# Patient Record
Sex: FEMALE | Race: WHITE | Employment: UNEMPLOYED | URBAN - METROPOLITAN AREA
[De-identification: names, ages, dates, MRNs, and addresses within clinical notes are randomized per-mention and may not be internally consistent; named-entity substitution may affect disease eponyms.]

---

## 2017-10-27 ENCOUNTER — HOSPITAL ENCOUNTER (OUTPATIENT)
Age: 52
Setting detail: SPECIMEN
Discharge: HOME | End: 2017-10-27
Payer: MEDICAID

## 2017-10-27 ENCOUNTER — OFFICE VISIT (OUTPATIENT)
Dept: FAMILY MEDICINE CLINIC | Age: 52
End: 2017-10-27
Payer: COMMERCIAL

## 2017-10-27 ENCOUNTER — TELEPHONE (OUTPATIENT)
Dept: FAMILY MEDICINE CLINIC | Age: 52
End: 2017-10-27

## 2017-10-27 VITALS
SYSTOLIC BLOOD PRESSURE: 128 MMHG | HEIGHT: 65 IN | OXYGEN SATURATION: 98 % | RESPIRATION RATE: 18 BRPM | BODY MASS INDEX: 29.16 KG/M2 | WEIGHT: 175 LBS | DIASTOLIC BLOOD PRESSURE: 72 MMHG | HEART RATE: 88 BPM | TEMPERATURE: 97.1 F

## 2017-10-27 DIAGNOSIS — M19.90 ARTHRITIS: ICD-10-CM

## 2017-10-27 DIAGNOSIS — Z28.21 IMMUNIZATION REFUSED: ICD-10-CM

## 2017-10-27 DIAGNOSIS — R53.83 FATIGUE, UNSPECIFIED TYPE: Primary | ICD-10-CM

## 2017-10-27 DIAGNOSIS — Z12.11 SCREENING FOR COLON CANCER: ICD-10-CM

## 2017-10-27 DIAGNOSIS — D69.6 THROMBOCYTOPENIA (HCC): ICD-10-CM

## 2017-10-27 DIAGNOSIS — R53.83 FATIGUE, UNSPECIFIED TYPE: ICD-10-CM

## 2017-10-27 DIAGNOSIS — E66.3 OVERWEIGHT (BMI 25.0-29.9): ICD-10-CM

## 2017-10-27 DIAGNOSIS — Z12.39 SCREENING FOR BREAST CANCER: ICD-10-CM

## 2017-10-27 DIAGNOSIS — K74.60 CIRRHOSIS OF LIVER WITHOUT ASCITES, UNSPECIFIED HEPATIC CIRRHOSIS TYPE (HCC): ICD-10-CM

## 2017-10-27 DIAGNOSIS — G43.809 OTHER MIGRAINE WITHOUT STATUS MIGRAINOSUS, NOT INTRACTABLE: ICD-10-CM

## 2017-10-27 DIAGNOSIS — R68.82 DECREASED LIBIDO: ICD-10-CM

## 2017-10-27 LAB
FOLATE: >20 NG/ML
TSH SERPL DL<=0.05 MIU/L-ACNC: 1.14 MIU/L (ref 0.3–5)
VITAMIN B-12: 1392 PG/ML (ref 211–946)
VITAMIN D 25-HYDROXY: 57.5 NG/ML (ref 30–100)

## 2017-10-27 PROCEDURE — 99214 OFFICE O/P EST MOD 30 MIN: CPT | Performed by: INTERNAL MEDICINE

## 2017-10-27 RX ORDER — BIOTIN 10000 MCG
10 CAPSULE ORAL
COMMUNITY
End: 2021-09-09

## 2017-10-27 ASSESSMENT — ENCOUNTER SYMPTOMS
VOMITING: 0
ABDOMINAL PAIN: 0
DIARRHEA: 0
COUGH: 0
SWOLLEN GLANDS: 0
CONSTIPATION: 0
SORE THROAT: 0
VISUAL CHANGE: 0
CHANGE IN BOWEL HABIT: 0
NAUSEA: 0

## 2017-10-27 ASSESSMENT — PATIENT HEALTH QUESTIONNAIRE - PHQ9
2. FEELING DOWN, DEPRESSED OR HOPELESS: 0
1. LITTLE INTEREST OR PLEASURE IN DOING THINGS: 0
SUM OF ALL RESPONSES TO PHQ9 QUESTIONS 1 & 2: 0
SUM OF ALL RESPONSES TO PHQ QUESTIONS 1-9: 0

## 2017-10-27 NOTE — TELEPHONE ENCOUNTER
Patient called into the [address] [place] [address] [address] stating that she was advised to get weight loss injection samples. I did not see any notes in the patient's chart about this so I advised her I would send a message to you. The patient wants to come in to get some samples.

## 2017-10-27 NOTE — PROGRESS NOTES
[address] [facility] [address]  Dept: [phone]  Dept Fax: [phone]    Shakir Solano is a 46 y.o. female who presents today for her medical conditions/complaints as noted below. Shakir Solano is c/o of   Chief Complaint   Patient presents with   Maggie Cummings New Doctor    Cirrhosis     Liver    Fatigue     Ongoing, worsening past year     Other     Loss of intamacy    Migraine     worseing    Other     Concern of anemia          HPI:     Here to establish care with pcp. 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 Also has hepatic hemangioma. She has not drank at all since 2015 [address] does not have any chronic medical conditions. Reviewed most recent labs with patient from [facility] . Has survleeince EGD upcoming in the next month  complianing of increaseing fatigue though and arthritic pain. Wants to take more natrual /supplements to treat due to concern over keeping liver healthy. Mookie also has a very decreased sexual drive /low libido. takign biflex for the arthtic pain , does take a mltivitmin daily as well but does not sound like she takes additional folic acid or thiamine. Does take lasix and aldactone due to liver disease and takes lactulose due to elevated ammonia . Did review preventative screening as well for which many pt is over due for . Has not had a colonoscopy yet. No family hx of breast or colon CA. Is overdue for mammogram but is asking if she can wait to do it until after the first of the year as she is quite busy right now and soon is graduating from the [employer] soon. She also refuses flu vaccine and pneumonia vaccine stating she has never had the flu before and besides liver is overall healthy . Fatigue   This is a recurrent problem.  The current episode started Sulfa Antibiotics        Health Maintenance   Topic Date Due    Hepatitis C screen  1965    HIV screen  12/13/1980    DTaP/Tdap/Td vaccine (1 - Tdap) 12/13/1984    Pneumococcal med risk (1 of 1 - PPSV23) 12/13/1984    Colon cancer screen colonoscopy  12/13/2015    Breast cancer screen  05/15/2017    Flu vaccine (1) 09/01/2017    Cervical cancer screen  05/15/2018    Lipid screen  10/20/2020       Subjective:      Review of Systems   Constitutional: Positive for fatigue. Negative for activity change, appetite change, chills, diaphoresis, fever and unexpected weight change. HENT: Negative for congestion and sore throat. Eyes: Negative for visual disturbance. Respiratory: Negative for cough and shortness of breath. Cardiovascular: Negative for chest pain, palpitations and leg swelling. Gastrointestinal: Negative for abdominal distention, abdominal pain, anal bleeding, anorexia, blood in stool, change in bowel habit, constipation, diarrhea, nausea, rectal pain and vomiting. Endocrine: Negative for cold intolerance, heat intolerance, polydipsia, polyphagia and polyuria. Genitourinary: Negative for decreased urine volume, difficulty urinating and urgency. Musculoskeletal: Positive for arthralgias. Negative for back pain, gait problem, joint swelling, myalgias and neck pain. Skin: Negative for color change, pallor and rash. Neurological: Positive for headaches. Negative for vertigo, weakness and numbness. Hematological: Negative for adenopathy. Does not bruise/bleed easily. Psychiatric/Behavioral: Negative for decreased concentration and sleep disturbance. Objective:     Physical Exam   Constitutional: She is oriented to person, place, and time. She appears well-developed and well-nourished. No distress.    HENT:   Right Ear: External ear normal.   Left Ear: External ear normal.   Nose: Nose normal.   Eyes: Conjunctivae and EOM are normal. Pupils are equal, round, and reactive acid daily in addition to the multivitamin . Will order some labs to workup the increasing fatigue but may be related to your liver disease. Your vitamin D was quite low in 2015 . Follow up in 2-3 months for pap/pelvic   Will call with results of labs   If symptoms worsen or persist please let us know so we can see you sooner. Risks of not receiving vaccine discussed and pt verbalized understanding. Plan:      No Follow-up on file. Orders Placed This Encounter   Procedures    NÉSTOR DIGITAL SCREEN W CAD BILATERAL     Standing Status:   Future     Standing Expiration Date:   10/27/2018     Order Specific Question:   Reason for exam:     Answer:   screening    TSH with Reflex     Standing Status:   Future     Standing Expiration Date:   10/27/2018    Vitamin B12 & Folate     Standing Status:   Future     Standing Expiration Date:   10/27/2018    Vitamin D 25 Hydroxy     Standing Status:   Future     Standing Expiration Date:   10/27/2018    Ambulatory referral to Gastroenterology     Referral Priority:   Routine     Referral Type:   Eval and Treat     Referral Reason:   Specialty Services Required     Requested Specialty:   Gastroenterology     Number of Visits Requested:   1     No orders of the defined types were placed in this encounter. Patient given educational materials - see patient instructions. Discussed use, benefit, and side effects of prescribed medications. All patient questions answered. Pt voiced understanding. Reviewed health maintenance. Instructed to continue current medications, diet and exercise. Patient agreed with treatment plan. Follow up as directed.      Electronically signed by Oxana Meeks DO on 10/27/2017 at 9:53 AM

## 2017-10-27 NOTE — PROGRESS NOTES
Visit Information    Have you changed or started any medications since your last visit including any over-the-counter medicines, vitamins, or herbal medicines? no   Are you having any side effects from any of your medications? -  no  Have you stopped taking any of your medications? Is so, why? -  no    Have you seen any other physician or provider since your last visit? No  Have you had any other diagnostic tests since your last visit? No  Have you been seen in the emergency room and/or had an admission to a hospital since we last saw you? No  Have you had your routine dental cleaning in the past 6 months? no    Have you activated your [employer] account? If not, what are your barriers?  No discussed with patient     Patient Care Team:  Judi Gan DO as PCP - General (Family Medicine)    Medical History Review  Past Medical, Family, and Social History reviewed and does contribute to the patient presenting condition    Health Maintenance   Topic Date Due    Hepatitis C screen  1965    HIV screen  12/13/1980    DTaP/Tdap/Td vaccine (1 - Tdap) 12/13/1984    Pneumococcal med risk (1 of 1 - PPSV23) 12/13/1984    Colon cancer screen colonoscopy  12/13/2015    Breast cancer screen  05/15/2017    Flu vaccine (1) 09/01/2017    Cervical cancer screen  05/15/2018    Lipid screen  10/20/2020

## 2017-10-28 ASSESSMENT — ENCOUNTER SYMPTOMS
COLOR CHANGE: 0
ANAL BLEEDING: 0
BACK PAIN: 0
ABDOMINAL DISTENTION: 0
BLOOD IN STOOL: 0
SHORTNESS OF BREATH: 0
RECTAL PAIN: 0

## 2017-10-30 ENCOUNTER — TELEPHONE (OUTPATIENT)
Dept: FAMILY MEDICINE CLINIC | Age: 52
End: 2017-10-30

## 2017-10-30 ENCOUNTER — HOSPITAL ENCOUNTER (OUTPATIENT)
Age: 52
Setting detail: SPECIMEN
Discharge: HOME | End: 2017-10-30
Payer: MEDICAID

## 2017-10-30 LAB
ANION GAP SERPL CALCULATED.3IONS-SCNC: 12 MMOL/L (ref 9–17)
CHLORIDE BLD-SCNC: 105 MMOL/L (ref 98–107)
CO2: 23 MMOL/L (ref 20–31)
POTASSIUM SERPL-SCNC: 3.9 MMOL/L (ref 3.7–5.3)
SODIUM BLD-SCNC: 140 MMOL/L (ref 135–144)

## 2017-10-30 NOTE — TELEPHONE ENCOUNTER
Okay thanks! , I had that info as well but contacted the drug rep to see if I could electronically send the script or if it absolutely had to be faxed but waiting to her back from him

## 2017-10-30 NOTE — TELEPHONE ENCOUNTER
Patient came by today and is wondering if you could RX a script for Saxenda. She came by today and picked up a sample pack. Alanna Renato states there is a mail order pharmacy that the patient can use so it is more affordable for her. Jose Joaquin has info in her office. Please advise.  Thanks! -Aime ARDON

## 2017-11-16 ENCOUNTER — HOSPITAL ENCOUNTER (OUTPATIENT)
Dept: GENERAL RADIOLOGY | Facility: CLINIC | Age: 52
Discharge: HOME | End: 2017-11-16
Payer: MEDICAID

## 2017-11-16 ENCOUNTER — OFFICE VISIT (OUTPATIENT)
Dept: FAMILY MEDICINE CLINIC | Age: 52
End: 2017-11-16
Payer: MEDICAID

## 2017-11-16 ENCOUNTER — OFFICE VISIT (OUTPATIENT)
Dept: ORTHOPEDIC SURGERY | Age: 52
End: 2017-11-16
Payer: MEDICAID

## 2017-11-16 ENCOUNTER — HOSPITAL ENCOUNTER (OUTPATIENT)
Age: 52
Setting detail: SPECIMEN
Discharge: HOME | End: 2017-11-16
Payer: MEDICAID

## 2017-11-16 VITALS
WEIGHT: 178 LBS | HEART RATE: 85 BPM | SYSTOLIC BLOOD PRESSURE: 114 MMHG | BODY MASS INDEX: 29.66 KG/M2 | OXYGEN SATURATION: 99 % | DIASTOLIC BLOOD PRESSURE: 71 MMHG | HEIGHT: 65 IN

## 2017-11-16 VITALS
BODY MASS INDEX: 29.49 KG/M2 | WEIGHT: 177 LBS | TEMPERATURE: 96.8 F | HEIGHT: 65 IN | HEART RATE: 78 BPM | SYSTOLIC BLOOD PRESSURE: 128 MMHG | RESPIRATION RATE: 18 BRPM | DIASTOLIC BLOOD PRESSURE: 74 MMHG | OXYGEN SATURATION: 98 %

## 2017-11-16 DIAGNOSIS — M25.562 CHRONIC PAIN OF LEFT KNEE: ICD-10-CM

## 2017-11-16 DIAGNOSIS — G89.29 CHRONIC PAIN OF LEFT KNEE: ICD-10-CM

## 2017-11-16 DIAGNOSIS — M25.40 PAINFUL SWELLING OF JOINT: ICD-10-CM

## 2017-11-16 DIAGNOSIS — M25.462 EFFUSION OF LEFT KNEE: Primary | ICD-10-CM

## 2017-11-16 DIAGNOSIS — M25.462 KNEE EFFUSION, LEFT: Primary | ICD-10-CM

## 2017-11-16 PROCEDURE — 20610 DRAIN/INJ JOINT/BURSA W/O US: CPT | Performed by: FAMILY MEDICINE

## 2017-11-16 PROCEDURE — 99213 OFFICE O/P EST LOW 20 MIN: CPT | Performed by: INTERNAL MEDICINE

## 2017-11-16 PROCEDURE — 73564 X-RAY EXAM KNEE 4 OR MORE: CPT

## 2017-11-16 PROCEDURE — 99203 OFFICE O/P NEW LOW 30 MIN: CPT | Performed by: FAMILY MEDICINE

## 2017-11-16 RX ORDER — RIFAXIMIN 550 MG/1
TABLET ORAL
Refills: 0 | COMMUNITY
Start: 2017-11-06 | End: 2018-07-24 | Stop reason: ALTCHOICE

## 2017-11-16 RX ORDER — TRIAMCINOLONE ACETONIDE 40 MG/ML
40 INJECTION, SUSPENSION INTRA-ARTICULAR; INTRAMUSCULAR ONCE
Status: COMPLETED | OUTPATIENT
Start: 2017-11-16 | End: 2017-11-16

## 2017-11-16 RX ADMIN — TRIAMCINOLONE ACETONIDE 40 MG: 40 INJECTION, SUSPENSION INTRA-ARTICULAR; INTRAMUSCULAR at 09:46

## 2017-11-16 ASSESSMENT — PATIENT HEALTH QUESTIONNAIRE - PHQ9
1. LITTLE INTEREST OR PLEASURE IN DOING THINGS: 0
SUM OF ALL RESPONSES TO PHQ9 QUESTIONS 1 & 2: 0
2. FEELING DOWN, DEPRESSED OR HOPELESS: 0
SUM OF ALL RESPONSES TO PHQ QUESTIONS 1-9: 0

## 2017-11-16 ASSESSMENT — ENCOUNTER SYMPTOMS
CONSTIPATION: 0
COLOR CHANGE: 0
ABDOMINAL PAIN: 0
BACK PAIN: 0
DIARRHEA: 0

## 2017-11-16 NOTE — PROGRESS NOTES
Visit Information    Have you changed or started any medications since your last visit including any over-the-counter medicines, vitamins, or herbal medicines? no   Are you having any side effects from any of your medications? -  no  Have you stopped taking any of your medications? Is so, why? -  no    Have you seen any other physician or provider since your last visit? No  Have you had any other diagnostic tests since your last visit? No  Have you been seen in the emergency room and/or had an admission to a hospital since we last saw you? No  Have you had your routine dental cleaning in the past 6 months? no    Have you activated your [employer] account? If not, what are your barriers?  No: Discussed today     Patient Care Team:  Brianna Valderrama, DO as PCP - General (Family Medicine)    Medical History Review  Past Medical, Family, and Social History reviewed and does contribute to the patient presenting condition    Health Maintenance   Topic Date Due    HIV screen  12/13/1980    Diabetes screen  12/13/2005    Colon cancer screen colonoscopy  12/13/2015    Breast cancer screen  05/15/2017    Flu vaccine (1) 10/25/2018 (Originally 9/1/2017)    DTaP/Tdap/Td vaccine (1 - Tdap) 10/31/2018 (Originally 12/13/1984)    Pneumococcal med risk (1 of 1 - PPSV23) 10/31/2018 (Originally 12/13/1984)    Cervical cancer screen  05/15/2018    Lipid screen  10/20/2020    Hepatitis C screen  Completed

## 2017-11-16 NOTE — PROGRESS NOTES
Sports Medicine Consultation     CHIEF COMPLAINT:  Knee Pain (Left knee pain)      HPI:  Jaskaran Emanuel is a 46y.o. year old female who is a new patient being seen for regarding new problem left knee pain. The pain has been present for 2-3 month(s). The patient recalls a nothing specific injury. The patient has tried nothing without improvement. The pain is described as achy. There is  pain on weightbearing. The knee does swell. There is is  painful popping and clicking. The knee does not catch or lock. It has not given out. It is  stiff upon arising from sitting. It is  painful to go up and down stairs and sit for a prolonged period of time. she has a past medical history of Anemia; Anxiety; Cirrhosis of liver ([address] and Indigestion. she has a past surgical history that includes Liver surgery (2015). family history includes Cancer in her father; Heart Disease in her father; High Cholesterol in her mother; Migraines in her paternal grandmother. Social History     Social History    Marital status:      Spouse name: N/A    Number of children: N/A    Years of education: N/A     Occupational History    Not on file.      Social History Main Topics    Smoking status: Never Smoker    Smokeless tobacco: Never Used    Alcohol use No    Drug use: No    Sexual activity: Yes     Partners: Male     Other Topics Concern    Not on file     Social History Narrative    No narrative on file       Current Outpatient Prescriptions   Medication Sig Dispense Refill    XIFAXAN 550 MG tablet   0    Biotin 10 MG CAPS Take 10 mg by mouth      esomeprazole (NEXIUM) 20 MG capsule Take 1 capsule by mouth daily 30 capsule 3    furosemide (LASIX) 20 MG tablet Take 1 tablet by mouth 2 times daily 60 tablet 0    lactulose encephalopathy (GENERLAC) 10 GM/15ML SOLN solution Take 15 mLs by mouth 2 times daily 900 mL 0    multivitamin (OCUVITE) TABS per tablet Take 1 tablet by mouth daily 30 of the Left knee were ordered, independently visualized by me, and discussed with patient. Findings: Left knee radiographs demonstrate some very minimal degenerative changes in the knee joint effusion    IMPRESSION:     1. Chronic pain of left knee          PLAN:   We discussed some of the etiologies and natural histories of     ICD-10-CM ICD-9-CM    1. Chronic pain of left knee M25.562 719.46     G89.29 338.29    . We discussed the various treatment alternatives including anti-inflammatory medications, physical therapy, injections, further imaging studies and as a last resort surgery. At this point I do think treating his effusion is appropriate and because her knee is warm aspiration is both therapeutic and diagnostic. Therefore after informed consent was obtained. Patient was prepped in a sterile manner with Betadine. The skin was cooled with cold spray then re-cleaned with alcohol prep. I did introduce an 18-gauge needle and aspirated 30 mL of clear joint fluid from the superior lateral compartment and sent that off for fluid culture crystals and cell count. I'll see her back on Tuesday with results to determine how we treat this going forward. Patient was understanding agreement plan. Return to clinic in No Follow-up on file. .    Electronically signed by Brianna Toro DO, FAOASM  on 11/16/17 at 10:56 AM

## 2017-11-16 NOTE — PROGRESS NOTES
[address] [address]  Dept: [phone]  Dept Fax: [phone]    Minnie Sinclair is a 46 y.o. female who presents today for her medical conditions/complaints as noted below. Minnie Sinclair is c/o of   Chief Complaint   Patient presents with    Knee Pain     left knee , fulid on knee worseing , painful, past few months    Wrist Pain     left wrist     Weight Loss     other DrMarin concerned with those injections         HPI:     Knee Pain    The incident occurred more than 1 week ago. There was no injury mechanism. The pain is present in the left knee. The pain is at a severity of 6/10. The pain is moderate. The pain has been worsening since onset. Pertinent negatives include no inability to bear weight, loss of motion, loss of sensation, muscle weakness, numbness or tingling. It is unknown if a foreign body is present. The symptoms are aggravated by movement, palpation and weight bearing. She has tried rest and non-weight bearing for the symptoms. The treatment provided mild relief. Wrist Pain    Pertinent negatives include no fever, inability to bear weight, numbness or tingling.        No results found for: LABA1C          ( goal A1C is < 7)   No results found for: LABMICR  No results found for: LDLCHOLESTEROL, LDLCALC    (goal LDL is <100)   No results found for: AST, ALT, BUN  BP Readings from Last 3 Encounters:   11/16/17 114/71   11/16/17 128/74   10/27/17 128/72          (goal 120/80)    Past Medical History:   Diagnosis Date    Anemia     Anxiety     Cirrhosis of liver (HCC)     Indigestion       Past Surgical History:   Procedure Laterality Date    LIVER SURGERY  2015       Family History   Problem Relation Age of Onset    Migraines Paternal Grandmother     High Cholesterol Mother     Cancer Father     Heart Disease Father        Social History   Substance Use Topics    Smoking status: Never Smoker    Smokeless tobacco: Never Used    Alcohol use No      Current Outpatient Prescriptions   Medication Sig Dispense Refill    XIFAXAN 550 MG tablet   0    Biotin 10 MG CAPS Take 10 mg by mouth      esomeprazole (NEXIUM) 20 MG capsule Take 1 capsule by mouth daily 30 capsule 3    furosemide (LASIX) 20 MG tablet Take 1 tablet by mouth 2 times daily 60 tablet 0    lactulose encephalopathy (GENERLAC) 10 GM/15ML SOLN solution Take 15 mLs by mouth 2 times daily 900 mL 0    multivitamin (OCUVITE) TABS per tablet Take 1 tablet by mouth daily 30 tablet 3    amitriptyline (ELAVIL) 25 MG tablet Take 1 tablet by mouth nightly 30 tablet 3     No current facility-administered medications for this visit. Allergies   Allergen Reactions    Aleve [Naproxen] Itching    Sulfa Antibiotics        Health Maintenance   Topic Date Due    HIV screen  12/13/1980    Diabetes screen  12/13/2005    Colon cancer screen colonoscopy  12/13/2015    Breast cancer screen  05/15/2017    Flu vaccine (1) 10/25/2018 (Originally 9/1/2017)    DTaP/Tdap/Td vaccine (1 - Tdap) 10/31/2018 (Originally 12/13/1984)    Pneumococcal med risk (1 of 1 - PPSV23) 10/31/2018 (Originally 12/13/1984)    Cervical cancer screen  05/15/2018    Lipid screen  10/20/2020    Hepatitis C screen  Completed       Subjective:      Review of Systems   Constitutional: Negative for fatigue, fever and unexpected weight change. Eyes: Negative for visual disturbance. Cardiovascular: Negative for chest pain and leg swelling. Gastrointestinal: Negative for abdominal pain, constipation and diarrhea. Musculoskeletal: Positive for arthralgias and joint swelling. Negative for back pain, gait problem, myalgias, neck pain and neck stiffness. Skin: Negative for color change, pallor and rash. Neurological: Negative for tingling, numbness and headaches. Psychiatric/Behavioral: Negative for sleep disturbance.        Objective:     Physical Exam   Constitutional: She is oriented to person, place, and time. She appears well-developed and well-nourished. No distress. HENT:   Right Ear: External ear normal.   Left Ear: External ear normal.   Nose: Nose normal.   Mouth/Throat: No oropharyngeal exudate. Eyes: EOM are normal. Pupils are equal, round, and reactive to light. Cardiovascular: Normal rate, regular rhythm and normal heart sounds. Exam reveals no friction rub. No murmur heard. Pulmonary/Chest: Effort normal and breath sounds normal.   Abdominal: Soft. Bowel sounds are normal. There is no splenomegaly or hepatomegaly. There is no tenderness. Musculoskeletal:        Left wrist: She exhibits tenderness. She exhibits normal range of motion, no bony tenderness, no swelling, no effusion, no crepitus, no deformity and no laceration. Left knee: She exhibits decreased range of motion, swelling, effusion and bony tenderness. She exhibits no ecchymosis, no deformity, no laceration, no erythema and normal patellar mobility. Tenderness found. + yvonne on right   No crepitus . No locking    Neurological: She is alert and oriented to person, place, and time. She displays no atrophy and no tremor. No cranial nerve deficit or sensory deficit. She exhibits normal muscle tone. Coordination and gait normal.   Skin: Skin is warm and dry. No rash noted. She is not diaphoretic. Psychiatric: She has a normal mood and affect. Nursing note and vitals reviewed. /74 (Site: Left Arm, Position: Sitting, Cuff Size: Large Adult)   Pulse 78   Temp 96.8 °F (36 °C) (Tympanic)   Resp 18   Ht 5' 5\" (1.651 m)   Wt 177 lb (80.3 kg)   LMP  (Within Weeks)   SpO2 98%   BMI 29.45 kg/m²     Assessment:      1. Knee effusion, left  triamcinolone acetonide (KENALOG-40) injection 40 mg   2. Painful swelling of joint  triamcinolone acetonide (KENALOG-40) injection 40 mg             Plan:      No Follow-up on file.     No orders of the defined types were placed in this

## 2017-11-17 ENCOUNTER — TELEPHONE (OUTPATIENT)
Dept: ORTHOPEDIC SURGERY | Age: 52
End: 2017-11-17

## 2017-11-17 NOTE — TELEPHONE ENCOUNTER
PATIENT CALLING TO LET YOU KNOW THAT SHE IS SO MUCH BETTER AND WANTED TO [address] YOU. ALSO SHE WANTED HER LAB REPORTS.

## 2017-11-21 ENCOUNTER — OFFICE VISIT (OUTPATIENT)
Dept: ORTHOPEDIC SURGERY | Age: 52
End: 2017-11-21
Payer: MEDICAID

## 2017-11-21 VITALS
OXYGEN SATURATION: 95 % | HEART RATE: 88 BPM | WEIGHT: 178 LBS | HEIGHT: 65 IN | BODY MASS INDEX: 29.66 KG/M2 | SYSTOLIC BLOOD PRESSURE: 128 MMHG | DIASTOLIC BLOOD PRESSURE: 82 MMHG

## 2017-11-21 DIAGNOSIS — M25.462 EFFUSION OF LEFT KNEE: Primary | ICD-10-CM

## 2017-11-21 PROCEDURE — 99213 OFFICE O/P EST LOW 20 MIN: CPT | Performed by: FAMILY MEDICINE

## 2017-11-21 PROCEDURE — 20610 DRAIN/INJ JOINT/BURSA W/O US: CPT | Performed by: FAMILY MEDICINE

## 2017-11-21 RX ORDER — BUPIVACAINE HYDROCHLORIDE 5 MG/ML
4 INJECTION, SOLUTION PERINEURAL ONCE
Status: COMPLETED | OUTPATIENT
Start: 2017-11-21 | End: 2017-11-21

## 2017-11-21 RX ORDER — TRIAMCINOLONE ACETONIDE 40 MG/ML
40 INJECTION, SUSPENSION INTRA-ARTICULAR; INTRAMUSCULAR ONCE
Status: COMPLETED | OUTPATIENT
Start: 2017-11-21 | End: 2017-11-21

## 2017-11-21 RX ADMIN — BUPIVACAINE HYDROCHLORIDE 20 MG: 5 INJECTION, SOLUTION PERINEURAL at 17:18

## 2017-11-21 RX ADMIN — TRIAMCINOLONE ACETONIDE 40 MG: 40 INJECTION, SUSPENSION INTRA-ARTICULAR; INTRAMUSCULAR at 17:19

## 2017-11-21 ASSESSMENT — PATIENT HEALTH QUESTIONNAIRE - PHQ9
SUM OF ALL RESPONSES TO PHQ QUESTIONS 1-9: 0
2. FEELING DOWN, DEPRESSED OR HOPELESS: 0
1. LITTLE INTEREST OR PLEASURE IN DOING THINGS: 0
SUM OF ALL RESPONSES TO PHQ9 QUESTIONS 1 & 2: 0

## 2017-11-21 NOTE — PROGRESS NOTES
by mouth daily 30 tablet 3    amitriptyline (ELAVIL) 25 MG tablet Take 1 tablet by mouth nightly 30 tablet 3     No current facility-administered medications for this visit. Allergies:  sheis allergic to aleve [naproxen] and sulfa antibiotics. ROS:  CV:  Denies chest pain; palpitations; shortness of breath; swelling of feet, ankles; and loss of consciousness. CON: Denies fever and dizziness. ENT:  Denies hearing loss / ringing, ear infections hoarseness, and swallowing problems. RESP:  Denies chronic cough, spitting up blood, and asthma/wheezing. GI: Denies abdominal pain, change in bowel habits, nausea or vomiting, and blood in stools. :  Denies frequent urination, burning or painful urination, blood in the urine, and bladder incontinence. NEURO:  Denies headache, memory loss, sleep disturbance, and tremor or movement disorder. PHYSICAL EXAM:   /82   Pulse 88   Ht 5' 5\" (1.651 m)   Wt 178 lb (80.7 kg)   LMP  (Within Weeks)   SpO2 95%   BMI 29.62 kg/m²   GENERAL: Jatin Gaming is a 46 y.o. female who is alert and oriented and sitting comfortably in our office. SKIN:  Intact without rashes, lesions or ulcerations. NEURO: Sensation to the extremity is intact. VASC:  Capillary refill is less than 3 seconds. Distal pulses are palpable. There is no lymphadenopathy. Knee Exam  Musculoskeletal/Neurologic:  Inspection-Swelling: mild, Ecchymosis: no  Palpation-Tenderness:none  Pain with patellar grind: no  ROM-0-135  Strength- WNL  Sensation-normal to light touch    Special Tests-  Varus Laxity: negative   Valgus Laxity:  negative   Anterior Drawer: negative   Posterior Drawer: negative  Lachman's: negative  Saeed's:negative  Gait: normal    PSYCH:  Good fund of knowledge and displays understanding of exam.    RADIOLOGY: No results found. Previous knee radiographs did demonstrate an effusion    IMPRESSION:     1.  Effusion of left knee          PLAN:   We discussed some of the etiologies and natural histories of     ICD-10-CM ICD-9-CM    1. Effusion of left knee M25.462 719.06    . We discussed the various treatment alternatives including anti-inflammatory medications, physical therapy, injections, further imaging studies and as a last resort surgery. At this point her effusion has come back I do think that a cortisone injection can help w pain and swelling and one was completed in the office f/u in 6wks    Return to clinic in No Follow-up on file. .    Electronically signed by Johanna Henry DO, FAOASM  on 11/21/17 at 8:19 AM    KNEE INJECTION PROCEDURE NOTE:  The patient was identified. The left knee was confirmed with the patient. After a sterile prep with Betadine followed by an alcohol wipe the knee was injected using a bent knee lateral joint line approach with a mixture of 4 mL of 0.5% Marcaine and 40 mg of Kenalog. Patient tolerated the procedure well without post injection complications. I instructed the patient to call our office immediately if they have any swelling or increased pain at the injection site.

## 2017-11-22 LAB
CULTURE: ABNORMAL
CULTURE: ABNORMAL
DIRECT EXAM: ABNORMAL
Lab: ABNORMAL
SPECIMEN DESCRIPTION: ABNORMAL
STATUS: ABNORMAL

## 2017-12-04 ENCOUNTER — TELEPHONE (OUTPATIENT)
Dept: ORTHOPEDIC SURGERY | Age: 52
End: 2017-12-04

## 2017-12-04 RX ORDER — SPIRONOLACTONE 100 MG/1
1 TABLET, FILM COATED ORAL DAILY
Refills: 0 | COMMUNITY
Start: 2017-11-21

## 2017-12-04 NOTE — TELEPHONE ENCOUNTER
Patient called on Friday c/o her leg being swollen the same leg you did the knee drainage from. She was in [place] an had a 13 hour drive home. I told her if her leg is swollen that she get it looked at before she comes  home because of the 13 hour car ride. Patient called this morning at [place] office and made apt with Dr. Reece Villafuerte to be seen  For leg. Just wanted you to know.

## 2017-12-05 ENCOUNTER — OFFICE VISIT (OUTPATIENT)
Dept: FAMILY MEDICINE CLINIC | Age: 52
End: 2017-12-05
Payer: MEDICAID

## 2017-12-05 ENCOUNTER — HOSPITAL ENCOUNTER (OUTPATIENT)
Age: 52
Setting detail: SPECIMEN
Discharge: HOME | End: 2017-12-05
Payer: MEDICAID

## 2017-12-05 VITALS
DIASTOLIC BLOOD PRESSURE: 74 MMHG | SYSTOLIC BLOOD PRESSURE: 128 MMHG | TEMPERATURE: 97.6 F | HEIGHT: 65 IN | HEART RATE: 68 BPM | OXYGEN SATURATION: 98 % | RESPIRATION RATE: 18 BRPM | BODY MASS INDEX: 30.82 KG/M2 | WEIGHT: 185 LBS

## 2017-12-05 DIAGNOSIS — Z12.11 SCREENING FOR COLON CANCER: ICD-10-CM

## 2017-12-05 DIAGNOSIS — R22.0 FACIAL SWELLING: ICD-10-CM

## 2017-12-05 DIAGNOSIS — M79.89 LEG SWELLING: ICD-10-CM

## 2017-12-05 DIAGNOSIS — K70.31 ALCOHOLIC CIRRHOSIS OF LIVER WITH ASCITES (HCC): ICD-10-CM

## 2017-12-05 DIAGNOSIS — M79.89 LEG SWELLING: Primary | ICD-10-CM

## 2017-12-05 PROCEDURE — 99213 OFFICE O/P EST LOW 20 MIN: CPT | Performed by: INTERNAL MEDICINE

## 2017-12-05 RX ORDER — FUROSEMIDE 40 MG/1
40 TABLET ORAL DAILY
Qty: 60 TABLET | Refills: 3 | Status: SHIPPED | OUTPATIENT
Start: 2017-12-05 | End: 2017-12-05 | Stop reason: SDUPTHER

## 2017-12-05 RX ORDER — FUROSEMIDE 40 MG/1
40 TABLET ORAL DAILY
Qty: 60 TABLET | Refills: 3 | Status: SHIPPED | OUTPATIENT
Start: 2017-12-05 | End: 2018-10-19

## 2017-12-05 ASSESSMENT — ENCOUNTER SYMPTOMS
BLOOD IN STOOL: 0
SHORTNESS OF BREATH: 0
CHEST TIGHTNESS: 0
FACIAL SWELLING: 1
COUGH: 0
NAUSEA: 0
ANAL BLEEDING: 0
VOMITING: 0
TROUBLE SWALLOWING: 0
CHOKING: 0
DIARRHEA: 0
ABDOMINAL PAIN: 0
ABDOMINAL DISTENTION: 0
RECTAL PAIN: 0
CONSTIPATION: 0

## 2017-12-05 ASSESSMENT — PATIENT HEALTH QUESTIONNAIRE - PHQ9
SUM OF ALL RESPONSES TO PHQ QUESTIONS 1-9: 0
SUM OF ALL RESPONSES TO PHQ9 QUESTIONS 1 & 2: 0
2. FEELING DOWN, DEPRESSED OR HOPELESS: 0
1. LITTLE INTEREST OR PLEASURE IN DOING THINGS: 0

## 2017-12-05 NOTE — PROGRESS NOTES
[address] [facility] [address]  Dept: [phone]  Dept Fax: [phone]    Minnie Sinclair is a 46 y.o. female who presents today for her medical conditions/complaints as noted below. Minnie Sinclair is c/o of   Chief Complaint   Patient presents with    Facial Swelling     x 2 weeks, retaining water    Leg Swelling     x 2 weeks,retaining water     Fatigue     worseing          HPI:     pATIENT HERE FOR SUBACUTE SWELLING THAT IS MORE GENERALIZED. He states he has had the left knee swelling for over a month now. She was seen here initially and we gave her a Kenalog shot. She then ended up seeing Dr. Bill Dobbs who did an x-ray which showed a small effusion. She states her symptoms continued to she went back to see Dr. Bill Dobbs on [phone] and he drained some fluid which did not grow anything based on culture that was reviewed today and he also gave her a steroid intra-articular injection at that time. Patient states she felt quite a bit better and her knee still overall feels better. However she states when she unwrapped the dressing she said her left leg was very vague this was a day or 2 after the draining and injection. She does state she had an increase in right leg swelling and since that time has had an increase in her weight gain that has been fairly fast.  She went to her son's graduation [place] as well and notes that she continued to gain weight she also noticed that in the last week she has had increased facial swelling as well. No abdominal   Abdominal pain or swelling that she notes no nausea vomiting no confusion no significant constipation or diarrhea. No specific right upper quadrant pain. He states he really hasn't noticed a change to her diet but states she sometimes does eat chips. Has not seen GI since I last saw her.   It does appear based on our records that since her visit on 1121 with Dr. Bill Dobbs she has tablet by mouth daily 30 tablet 3    amitriptyline (ELAVIL) 25 MG tablet Take 1 tablet by mouth nightly 30 tablet 3     No current facility-administered medications for this visit. Allergies   Allergen Reactions    Aleve [Naproxen] Itching    Sulfa Antibiotics        Health Maintenance   Topic Date Due    HIV screen  12/13/1980    Diabetes screen  12/13/2005    Colon cancer screen colonoscopy  12/13/2015    Breast cancer screen  05/15/2017    Flu vaccine (1) 10/25/2018 (Originally 9/1/2017)    DTaP/Tdap/Td vaccine (1 - Tdap) 10/31/2018 (Originally 12/13/1984)    Pneumococcal med risk (1 of 1 - PPSV23) 10/31/2018 (Originally 12/13/1984)    Cervical cancer screen  05/15/2018    Lipid screen  10/20/2020    Hepatitis C screen  Completed       Subjective:      Review of Systems   Constitutional: Positive for fatigue and unexpected weight change. Negative for activity change, appetite change, chills, diaphoresis and fever. HENT: Positive for facial swelling. Negative for trouble swallowing. Eyes: Negative for visual disturbance. Respiratory: Negative for cough, choking, chest tightness and shortness of breath. Cardiovascular: Positive for leg swelling. Negative for chest pain and palpitations. Gastrointestinal: Negative for abdominal distention, abdominal pain, anal bleeding, blood in stool, constipation, diarrhea, nausea, rectal pain and vomiting. Genitourinary: Negative for decreased urine volume and urgency. Musculoskeletal: Negative for arthralgias. Skin: Negative for rash. Neurological: Negative for headaches. Psychiatric/Behavioral: Negative for confusion and sleep disturbance. Objective:     Physical Exam   Constitutional: She is oriented to person, place, and time. She appears well-developed and well-nourished. No distress. HENT:   Right Ear: External ear normal.   Left Ear: External ear normal.   Nose: Nose normal.   Mouth/Throat: No oropharyngeal exudate.    Eyes: EOM are normal. Pupils are equal, round, and reactive to light. Neck: Normal range of motion. Neck supple. No JVD present. No thyromegaly present. Cardiovascular: Normal rate, regular rhythm and normal heart sounds. Exam reveals no gallop and no friction rub. No murmur heard. Pulmonary/Chest: Effort normal and breath sounds normal.   Abdominal: Soft. Bowel sounds are normal. She exhibits distension. There is no splenomegaly or hepatomegaly. There is no tenderness. Lymphadenopathy:   1-2 + pitting edema to lower exts bilaterally, no increased erythema or warmth  Negative homans     Also a mild amount of generalized facial swelling      Neurological: She is alert and oriented to person, place, and time. No cranial nerve deficit. Skin: Skin is warm and dry. No rash noted. She is not diaphoretic. Psychiatric: She has a normal mood and affect. Nursing note and vitals reviewed. /74 (Site: Left Thigh, Position: Sitting, Cuff Size: Large Adult)   Pulse 68   Temp 97.6 °F (36.4 °C) (Tympanic)   Resp 18   Ht 5' 5\" (1.651 m)   Wt 185 lb (83.9 kg)   LMP  (Within Weeks)   SpO2 98%   BMI 30.79 kg/m²     Assessment:      1. Screening for colon cancer  POCT Fecal Immunochemical Test (FIT)   2. Leg swelling  furosemide (LASIX) 40 MG tablet    Brain Natriuretic Peptide    Creatinine    Ammonia   3. Alcoholic cirrhosis of liver with ascites (HCC)  furosemide (LASIX) 40 MG tablet    Creatinine    Ammonia   4. Facial swelling  Brain Natriuretic Peptide             Plan:      Return in about 1 week (around 12/12/2017) for weight check .     Orders Placed This Encounter   Procedures    Brain Natriuretic Peptide     Standing Status:   Future     Standing Expiration Date:   12/5/2018    Creatinine     Standing Status:   Future     Standing Expiration Date:   12/5/2018    Ammonia     Standing Status:   Future     Standing Expiration Date:   12/5/2018    POCT Fecal Immunochemical Test (FIT)     Standing doctor. Patient given educational materials - see patient instructions. Discussed use, benefit, and side effects of prescribed medications. All patient questions answered. Pt voiced understanding. Reviewed health maintenance. Instructed to continue current medications, diet and exercise. Patient agreed with treatment plan. Follow up as directed.      Electronically signed by Hellen Mccoy DO on 12/5/2017 at 9:10 AM

## 2017-12-05 NOTE — PROGRESS NOTES
Visit Information    Have you changed or started any medications since your last visit including any over-the-counter medicines, vitamins, or herbal medicines? no   Are you having any side effects from any of your medications? -  no  Have you stopped taking any of your medications? Is so, why? -  no    Have you seen any other physician or provider since your last visit? Yes - Records Obtained  Have you had any other diagnostic tests since your last visit? Yes - Records Obtained  Have you been seen in the emergency room and/or had an admission to a hospital since we last saw you? No  Have you had your routine dental cleaning in the past 6 months? no    Have you activated your [employer] account? If not, what are your barriers?  Yes     Patient Care Team:  Bethany Ridley DO as PCP - General (Family Medicine)    Medical History Review  Past Medical, Family, and Social History reviewed and does contribute to the patient presenting condition    Health Maintenance   Topic Date Due    HIV screen  12/13/1980    Diabetes screen  12/13/2005    Colon cancer screen colonoscopy  12/13/2015    Breast cancer screen  05/15/2017    Flu vaccine (1) 10/25/2018 (Originally 9/1/2017)    DTaP/Tdap/Td vaccine (1 - Tdap) 10/31/2018 (Originally 12/13/1984)    Pneumococcal med risk (1 of 1 - PPSV23) 10/31/2018 (Originally 12/13/1984)    Cervical cancer screen  05/15/2018    Lipid screen  10/20/2020    Hepatitis C screen  Completed

## 2017-12-06 LAB
ALBUMIN SERPL-MCNC: 3.6 G/DL (ref 3.5–5.2)
ANION GAP SERPL CALCULATED.3IONS-SCNC: 14 MMOL/L (ref 9–17)
CHLORIDE BLD-SCNC: 101 MMOL/L (ref 98–107)
CO2: 23 MMOL/L (ref 20–31)
POTASSIUM SERPL-SCNC: 4.2 MMOL/L (ref 3.7–5.3)
SODIUM BLD-SCNC: 138 MMOL/L (ref 135–144)

## 2017-12-11 ENCOUNTER — OFFICE VISIT (OUTPATIENT)
Dept: FAMILY MEDICINE CLINIC | Age: 52
End: 2017-12-11
Payer: MEDICAID

## 2017-12-11 VITALS
OXYGEN SATURATION: 99 % | HEIGHT: 65 IN | WEIGHT: 173 LBS | RESPIRATION RATE: 18 BRPM | BODY MASS INDEX: 28.82 KG/M2 | SYSTOLIC BLOOD PRESSURE: 128 MMHG | DIASTOLIC BLOOD PRESSURE: 67 MMHG | HEART RATE: 68 BPM | TEMPERATURE: 96.8 F

## 2017-12-11 DIAGNOSIS — R22.43 LOCALIZED SWELLING OF BOTH LOWER LEGS: ICD-10-CM

## 2017-12-11 DIAGNOSIS — K72.10 CHRONIC LIVER FAILURE WITHOUT HEPATIC COMA (HCC): Primary | ICD-10-CM

## 2017-12-11 DIAGNOSIS — R19.07 GENERALIZED ABDOMINAL SWELLING: ICD-10-CM

## 2017-12-11 PROCEDURE — 99213 OFFICE O/P EST LOW 20 MIN: CPT | Performed by: INTERNAL MEDICINE

## 2017-12-11 ASSESSMENT — ENCOUNTER SYMPTOMS
STRIDOR: 0
VOICE CHANGE: 0
BACK PAIN: 0
ABDOMINAL DISTENTION: 0
NAUSEA: 0
TROUBLE SWALLOWING: 0
SHORTNESS OF BREATH: 0
COLOR CHANGE: 0
DIARRHEA: 0
RECTAL PAIN: 0
WHEEZING: 0
VOMITING: 0
CONSTIPATION: 0
ABDOMINAL PAIN: 0
BLOOD IN STOOL: 0
ANAL BLEEDING: 0

## 2017-12-11 ASSESSMENT — PATIENT HEALTH QUESTIONNAIRE - PHQ9
SUM OF ALL RESPONSES TO PHQ9 QUESTIONS 1 & 2: 0
SUM OF ALL RESPONSES TO PHQ QUESTIONS 1-9: 0
2. FEELING DOWN, DEPRESSED OR HOPELESS: 0
1. LITTLE INTEREST OR PLEASURE IN DOING THINGS: 0

## 2017-12-11 NOTE — PROGRESS NOTES
Psychiatric/Behavioral: Negative for sleep disturbance. Objective:     Physical Exam   Constitutional: She is oriented to person, place, and time. She appears well-developed and well-nourished. Neck: Normal range of motion. Neck supple. Cardiovascular: Normal rate, regular rhythm and normal heart sounds. Pulmonary/Chest: Effort normal and breath sounds normal.   Abdominal: Soft. Bowel sounds are normal. She exhibits no distension and no mass. There is no splenomegaly or hepatomegaly. There is no tenderness. There is no rebound and no guarding. Neurological: She is alert and oriented to person, place, and time. Skin: Skin is warm and dry. No rash noted. Psychiatric: She has a normal mood and affect. Nursing note and vitals reviewed. /67 (Site: Left Arm, Position: Sitting, Cuff Size: Large Adult)   Pulse 68   Temp 96.8 °F (36 °C) (Tympanic)   Resp 18   Ht 5' 5\" (1.651 m)   Wt 173 lb (78.5 kg)   LMP  (Within Weeks)   SpO2 99%   BMI 28.79 kg/m²     Assessment:      1. Chronic liver failure without hepatic coma ([facility] [address]     2. Generalized abdominal swelling  Comprehensive Metabolic Panel   3. Localized swelling of both lower legs  Comprehensive Metabolic Panel             Plan:      Return in about 3 months (around 3/11/2018) for routine and med check . Orders Placed This Encounter   Procedures    Comprehensive Metabolic Panel     Standing Status:   Future     Standing Expiration Date:   12/11/2018     No orders of the defined types were placed in this encounter. Advised patient she can do the 40 mg BID for the next week or two for any residual edema. NEED recheck of cr/lytes though given elevated dose in about 2 weeks. Then can go back to 40 mg once daily of Lasix and the continued 100 mg of spironolactone. This was likely related to some increase in sodium intake and also 2 steroid doses in close proximity to each other.      Continue healthy eating , limiting sodium as much as possible and increasing physical activity to lose further weight as given liver cannot take a lot of weight loss meds. Will call with lab results and ultrasound once we get it and pricilla with any questions or concerns or if need to be seen sooner for anything else     Can come in for weight check again as well in week or two as nurse visit. Patient given educational materials - see patient instructions. Discussed use, benefit, and side effects of prescribed medications. All patient questions answered. Pt voiced understanding. Reviewed health maintenance. Instructed to continue current medications, diet and exercise. Patient agreed with treatment plan. Follow up as directed.      Electronically signed by Maru العلي DO on 12/11/2017 at 9:56 AM

## 2017-12-11 NOTE — PROGRESS NOTES
Visit Information    Have you changed or started any medications since your last visit including any over-the-counter medicines, vitamins, or herbal medicines? no   Are you having any side effects from any of your medications? -  no  Have you stopped taking any of your medications? Is so, why? -  no    Have you seen any other physician or provider since your last visit? No  Have you had any other diagnostic tests since your last visit? No  Have you been seen in the emergency room and/or had an admission to a hospital since we last saw you? No  Have you had your routine dental cleaning in the past 6 months? no    Have you activated your [employer] account? If not, what are your barriers?  Yes     Patient Care Team:  Kuldip Evans DO as PCP - General (Family Medicine)    Medical History Review  Past Medical, Family, and Social History reviewed and does contribute to the patient presenting condition    Health Maintenance   Topic Date Due    HIV screen  12/13/1980    Diabetes screen  12/13/2005    Colon cancer screen colonoscopy  12/13/2015    Breast cancer screen  05/15/2017    Flu vaccine (1) 10/25/2018 (Originally 9/1/2017)    DTaP/Tdap/Td vaccine (1 - Tdap) 10/31/2018 (Originally 12/13/1984)    Pneumococcal med risk (1 of 1 - PPSV23) 10/31/2018 (Originally 12/13/1984)    Cervical cancer screen  05/15/2018    Lipid screen  10/20/2020    Hepatitis C screen  Completed

## 2018-01-24 ENCOUNTER — TELEPHONE (OUTPATIENT)
Dept: FAMILY MEDICINE CLINIC | Age: 53
End: 2018-01-24

## 2018-01-24 DIAGNOSIS — N30.00 ACUTE CYSTITIS WITHOUT HEMATURIA: Primary | ICD-10-CM

## 2018-01-24 RX ORDER — NITROFURANTOIN 25; 75 MG/1; MG/1
100 CAPSULE ORAL 2 TIMES DAILY
Qty: 10 CAPSULE | Refills: 0 | Status: SHIPPED | OUTPATIENT
Start: 2018-01-24 | End: 2018-05-24 | Stop reason: SDUPTHER

## 2018-01-24 RX ORDER — PHENAZOPYRIDINE HYDROCHLORIDE 200 MG/1
200 TABLET, FILM COATED ORAL 3 TIMES DAILY PRN
Qty: 20 TABLET | Refills: 0 | Status: SHIPPED | OUTPATIENT
Start: 2018-01-24 | End: 2018-01-27

## 2018-01-24 NOTE — TELEPHONE ENCOUNTER
im not sure what that is Calin Koch as we have never treat here is looks like for a urinary tract infection here can you find out ? If she has an active my chart I can do an e-visit with here also if she would prefer. Thanks!

## 2018-03-30 ENCOUNTER — TELEPHONE (OUTPATIENT)
Dept: FAMILY MEDICINE CLINIC | Age: 53
End: 2018-03-30

## 2018-03-30 ENCOUNTER — OFFICE VISIT (OUTPATIENT)
Dept: FAMILY MEDICINE CLINIC | Age: 53
End: 2018-03-30
Payer: COMMERCIAL

## 2018-03-30 VITALS
DIASTOLIC BLOOD PRESSURE: 78 MMHG | BODY MASS INDEX: 30.66 KG/M2 | WEIGHT: 184 LBS | HEIGHT: 65 IN | RESPIRATION RATE: 18 BRPM | HEART RATE: 93 BPM | TEMPERATURE: 97.9 F | SYSTOLIC BLOOD PRESSURE: 136 MMHG | OXYGEN SATURATION: 96 %

## 2018-03-30 DIAGNOSIS — R05.9 COUGH: ICD-10-CM

## 2018-03-30 DIAGNOSIS — J40 BRONCHITIS: Primary | ICD-10-CM

## 2018-03-30 PROCEDURE — 99213 OFFICE O/P EST LOW 20 MIN: CPT | Performed by: INTERNAL MEDICINE

## 2018-03-30 RX ORDER — BENZONATATE 100 MG/1
100 CAPSULE ORAL 3 TIMES DAILY PRN
Qty: 30 CAPSULE | Refills: 0 | Status: SHIPPED | OUTPATIENT
Start: 2018-03-30 | End: 2018-04-06

## 2018-03-30 RX ORDER — ALBUTEROL SULFATE 90 UG/1
2 AEROSOL, METERED RESPIRATORY (INHALATION) EVERY 4 HOURS PRN
Qty: 1 INHALER | Refills: 0 | Status: SHIPPED | OUTPATIENT
Start: 2018-03-30 | End: 2018-08-20 | Stop reason: ALTCHOICE

## 2018-03-30 RX ORDER — LACTULOSE 10 G/15ML
SOLUTION ORAL
Refills: 0 | COMMUNITY
Start: 2018-03-23 | End: 2019-03-19

## 2018-03-30 RX ORDER — BENZONATATE 100 MG/1
100 CAPSULE ORAL 3 TIMES DAILY PRN
Qty: 30 CAPSULE | Refills: 0 | Status: SHIPPED | OUTPATIENT
Start: 2018-03-30 | End: 2018-03-30 | Stop reason: SDUPTHER

## 2018-03-30 RX ORDER — ALBUTEROL SULFATE 90 UG/1
2 AEROSOL, METERED RESPIRATORY (INHALATION) EVERY 6 HOURS PRN
Qty: 1 INHALER | Refills: 0 | Status: SHIPPED | OUTPATIENT
Start: 2018-03-30 | End: 2018-03-30 | Stop reason: SDUPTHER

## 2018-03-30 RX ORDER — FUROSEMIDE 20 MG/1
TABLET ORAL
Refills: 0 | COMMUNITY
Start: 2018-03-20 | End: 2018-08-20 | Stop reason: ALTCHOICE

## 2018-03-30 RX ORDER — ALBUTEROL SULFATE 90 UG/1
2 AEROSOL, METERED RESPIRATORY (INHALATION) EVERY 6 HOURS PRN
Qty: 1 INHALER | Refills: 3 | Status: SHIPPED | OUTPATIENT
Start: 2018-03-30 | End: 2018-07-24 | Stop reason: ALTCHOICE

## 2018-04-01 ASSESSMENT — ENCOUNTER SYMPTOMS
VOICE CHANGE: 0
DIARRHEA: 0
SINUS PAIN: 0
RECTAL PAIN: 0
VOMITING: 0
COUGH: 1
CHEST TIGHTNESS: 0
SINUS PRESSURE: 0
SORE THROAT: 0
BACK PAIN: 0
RHINORRHEA: 0
BLOOD IN STOOL: 0
HEMOPTYSIS: 0
CHOKING: 0
STRIDOR: 0
FACIAL SWELLING: 0
APNEA: 0
ABDOMINAL PAIN: 0
CONSTIPATION: 0
ANAL BLEEDING: 0
ABDOMINAL DISTENTION: 0
SHORTNESS OF BREATH: 0
HEARTBURN: 0
TROUBLE SWALLOWING: 0
WHEEZING: 0

## 2018-04-26 ENCOUNTER — TELEPHONE (OUTPATIENT)
Dept: FAMILY MEDICINE CLINIC | Age: 53
End: 2018-04-26

## 2018-04-26 DIAGNOSIS — K72.10 CHRONIC LIVER FAILURE WITHOUT HEPATIC COMA (HCC): Primary | ICD-10-CM

## 2018-05-24 DIAGNOSIS — N30.00 ACUTE CYSTITIS WITHOUT HEMATURIA: ICD-10-CM

## 2018-05-24 RX ORDER — NITROFURANTOIN 25; 75 MG/1; MG/1
100 CAPSULE ORAL 2 TIMES DAILY
Qty: 10 CAPSULE | Refills: 0 | Status: SHIPPED | OUTPATIENT
Start: 2018-05-24 | End: 2018-05-29

## 2018-06-11 DIAGNOSIS — R51.9 NONINTRACTABLE HEADACHE, UNSPECIFIED CHRONICITY PATTERN, UNSPECIFIED HEADACHE TYPE: ICD-10-CM

## 2018-06-11 DIAGNOSIS — F51.01 PRIMARY INSOMNIA: ICD-10-CM

## 2018-06-11 RX ORDER — AMITRIPTYLINE HYDROCHLORIDE 25 MG/1
25 TABLET, FILM COATED ORAL NIGHTLY
Qty: 30 TABLET | Refills: 3 | Status: SHIPPED | OUTPATIENT
Start: 2018-06-11 | End: 2018-06-18 | Stop reason: SDUPTHER

## 2018-06-18 ENCOUNTER — OFFICE VISIT (OUTPATIENT)
Dept: FAMILY MEDICINE CLINIC | Age: 53
End: 2018-06-18
Payer: COMMERCIAL

## 2018-06-18 VITALS
BODY MASS INDEX: 30.49 KG/M2 | SYSTOLIC BLOOD PRESSURE: 124 MMHG | HEIGHT: 65 IN | WEIGHT: 183 LBS | TEMPERATURE: 96.7 F | DIASTOLIC BLOOD PRESSURE: 86 MMHG | HEART RATE: 78 BPM | OXYGEN SATURATION: 95 % | RESPIRATION RATE: 18 BRPM

## 2018-06-18 DIAGNOSIS — Z09 HOSPITAL DISCHARGE FOLLOW-UP: ICD-10-CM

## 2018-06-18 DIAGNOSIS — K72.10 CHRONIC LIVER FAILURE WITHOUT HEPATIC COMA (HCC): ICD-10-CM

## 2018-06-18 DIAGNOSIS — B35.3 TINEA PEDIS OF RIGHT FOOT: Primary | ICD-10-CM

## 2018-06-18 DIAGNOSIS — Z12.39 SCREENING FOR BREAST CANCER: ICD-10-CM

## 2018-06-18 DIAGNOSIS — E66.9 CLASS 1 OBESITY WITH BODY MASS INDEX (BMI) OF 30.0 TO 30.9 IN ADULT, UNSPECIFIED OBESITY TYPE, UNSPECIFIED WHETHER SERIOUS COMORBIDITY PRESENT: ICD-10-CM

## 2018-06-18 DIAGNOSIS — F51.01 PRIMARY INSOMNIA: ICD-10-CM

## 2018-06-18 DIAGNOSIS — R35.0 URINARY FREQUENCY: ICD-10-CM

## 2018-06-18 DIAGNOSIS — R05.9 COUGH: ICD-10-CM

## 2018-06-18 DIAGNOSIS — I87.2 VENOUS INSUFFICIENCY: ICD-10-CM

## 2018-06-18 DIAGNOSIS — R51.9 NONINTRACTABLE HEADACHE, UNSPECIFIED CHRONICITY PATTERN, UNSPECIFIED HEADACHE TYPE: ICD-10-CM

## 2018-06-18 LAB
BILIRUBIN, POC: NEGATIVE
BLOOD URINE, POC: NEGATIVE
CLARITY, POC: CLEAR
COLOR, POC: YELLOW
GLUCOSE URINE, POC: NEGATIVE
KETONES, POC: NEGATIVE
LEUKOCYTE EST, POC: ABNORMAL
NITRITE, POC: NEGATIVE
PH, POC: 7.5
PROTEIN, POC: NEGATIVE
SPECIFIC GRAVITY, POC: 1.01
UROBILINOGEN, POC: 0.2

## 2018-06-18 PROCEDURE — 99213 OFFICE O/P EST LOW 20 MIN: CPT | Performed by: INTERNAL MEDICINE

## 2018-06-18 PROCEDURE — 81002 URINALYSIS NONAUTO W/O SCOPE: CPT | Performed by: INTERNAL MEDICINE

## 2018-06-18 RX ORDER — BENZONATATE 100 MG/1
100 CAPSULE ORAL 3 TIMES DAILY PRN
Qty: 30 CAPSULE | Refills: 0 | Status: SHIPPED | OUTPATIENT
Start: 2018-06-18 | End: 2018-08-20 | Stop reason: SDUPTHER

## 2018-06-18 RX ORDER — AMITRIPTYLINE HYDROCHLORIDE 25 MG/1
25 TABLET, FILM COATED ORAL NIGHTLY
Qty: 90 TABLET | Refills: 3 | Status: SHIPPED | OUTPATIENT
Start: 2018-06-18 | End: 2019-08-12 | Stop reason: SDUPTHER

## 2018-06-18 ASSESSMENT — ENCOUNTER SYMPTOMS
VOMITING: 0
CONSTIPATION: 0
COUGH: 1
SORE THROAT: 0
STRIDOR: 0
ABDOMINAL PAIN: 0
DIARRHEA: 0
BLOOD IN STOOL: 0
SHORTNESS OF BREATH: 0
NAUSEA: 0
SWOLLEN GLANDS: 0
WHEEZING: 0

## 2018-06-28 ENCOUNTER — OFFICE VISIT (OUTPATIENT)
Dept: ORTHOPEDIC SURGERY | Age: 53
End: 2018-06-28
Payer: COMMERCIAL

## 2018-06-28 VITALS
HEART RATE: 92 BPM | DIASTOLIC BLOOD PRESSURE: 76 MMHG | WEIGHT: 181 LBS | HEIGHT: 64 IN | BODY MASS INDEX: 30.9 KG/M2 | SYSTOLIC BLOOD PRESSURE: 130 MMHG

## 2018-06-28 DIAGNOSIS — M17.0 PRIMARY OSTEOARTHRITIS OF BOTH KNEES: Primary | ICD-10-CM

## 2018-06-28 PROCEDURE — 20610 DRAIN/INJ JOINT/BURSA W/O US: CPT | Performed by: FAMILY MEDICINE

## 2018-06-28 PROCEDURE — 99213 OFFICE O/P EST LOW 20 MIN: CPT | Performed by: FAMILY MEDICINE

## 2018-06-28 RX ORDER — TRIAMCINOLONE ACETONIDE 40 MG/ML
40 INJECTION, SUSPENSION INTRA-ARTICULAR; INTRAMUSCULAR ONCE
Status: CANCELLED | OUTPATIENT
Start: 2018-06-28 | End: 2018-06-28

## 2018-06-28 RX ORDER — BUPIVACAINE HYDROCHLORIDE 5 MG/ML
8 INJECTION, SOLUTION PERINEURAL ONCE
Status: COMPLETED | OUTPATIENT
Start: 2018-06-28 | End: 2018-06-28

## 2018-06-28 RX ORDER — TRIAMCINOLONE ACETONIDE 40 MG/ML
40 INJECTION, SUSPENSION INTRA-ARTICULAR; INTRAMUSCULAR ONCE
Status: COMPLETED | OUTPATIENT
Start: 2018-06-28 | End: 2018-06-28

## 2018-06-28 RX ADMIN — TRIAMCINOLONE ACETONIDE 40 MG: 40 INJECTION, SUSPENSION INTRA-ARTICULAR; INTRAMUSCULAR at 15:30

## 2018-06-28 RX ADMIN — BUPIVACAINE HYDROCHLORIDE 40 MG: 5 INJECTION, SOLUTION PERINEURAL at 15:08

## 2018-07-24 ENCOUNTER — TELEPHONE (OUTPATIENT)
Dept: FAMILY MEDICINE CLINIC | Age: 53
End: 2018-07-24

## 2018-07-24 ENCOUNTER — OFFICE VISIT (OUTPATIENT)
Dept: FAMILY MEDICINE CLINIC | Age: 53
End: 2018-07-24
Payer: COMMERCIAL

## 2018-07-24 VITALS
RESPIRATION RATE: 16 BRPM | WEIGHT: 177 LBS | DIASTOLIC BLOOD PRESSURE: 82 MMHG | OXYGEN SATURATION: 99 % | SYSTOLIC BLOOD PRESSURE: 140 MMHG | TEMPERATURE: 98.1 F | BODY MASS INDEX: 30.38 KG/M2 | HEART RATE: 85 BPM

## 2018-07-24 DIAGNOSIS — L70.0 ACNE VULGARIS: Primary | ICD-10-CM

## 2018-07-24 DIAGNOSIS — R21 FACIAL RASH: ICD-10-CM

## 2018-07-24 PROCEDURE — 99213 OFFICE O/P EST LOW 20 MIN: CPT | Performed by: INTERNAL MEDICINE

## 2018-07-24 RX ORDER — FLUCONAZOLE 150 MG/1
150 TABLET ORAL ONCE
Qty: 2 TABLET | Refills: 0 | Status: SHIPPED | OUTPATIENT
Start: 2018-07-24 | End: 2018-07-24

## 2018-07-24 RX ORDER — TRETINOIN 0.1 MG/G
GEL TOPICAL
Qty: 45 G | Refills: 2 | Status: SHIPPED | OUTPATIENT
Start: 2018-07-24 | End: 2019-03-19

## 2018-07-24 RX ORDER — MINOCYCLINE HYDROCHLORIDE 100 MG/1
100 CAPSULE ORAL DAILY
Qty: 30 CAPSULE | Refills: 0 | Status: SHIPPED | OUTPATIENT
Start: 2018-07-24 | End: 2019-03-19

## 2018-07-24 ASSESSMENT — ENCOUNTER SYMPTOMS
APNEA: 0
ABDOMINAL PAIN: 0
COLOR CHANGE: 1
VOMITING: 0
COUGH: 0
STRIDOR: 0
CONSTIPATION: 0
WHEEZING: 0
DIARRHEA: 0
CHOKING: 0
RHINORRHEA: 0
EYE PAIN: 0
SORE THROAT: 0
FACIAL SWELLING: 0
SHORTNESS OF BREATH: 0
CHEST TIGHTNESS: 0
NAIL CHANGES: 0

## 2018-07-24 NOTE — PROGRESS NOTES
 Cancer Father     Heart Disease Father        Social History   Substance Use Topics    Smoking status: Never Smoker    Smokeless tobacco: Never Used    Alcohol use No      Current Outpatient Prescriptions   Medication Sig Dispense Refill    tretinoin (RETIN-A) 0.01 % gel Apply topically nightly. 45 g 2    minocycline (MINOCIN;DYNACIN) 100 MG capsule Take 1 capsule by mouth daily Take with food, but avoid dairy, calcium and MTV's 2 hours before and after the dose 30 capsule 0    fluconazole (DIFLUCAN) 150 MG tablet Take 1 tablet by mouth once for 1 dose 2 tablet 0    rifaximin (XIFAXAN) 550 MG tablet Take 550 mg by mouth      amitriptyline (ELAVIL) 25 MG tablet Take 1 tablet by mouth nightly 90 tablet 3    CONSTULOSE 10 GM/15ML solution   0    furosemide (LASIX) 20 MG tablet   0    furosemide (LASIX) 40 MG tablet Take 1 tablet by mouth daily 60 tablet 3    spironolactone (ALDACTONE) 100 MG tablet Take 1 tablet by mouth daily  0    Biotin 10 MG CAPS Take 10 mg by mouth      esomeprazole (NEXIUM) 20 MG capsule Take 1 capsule by mouth daily 30 capsule 3    lactulose encephalopathy (GENERLAC) 10 GM/15ML SOLN solution Take 15 mLs by mouth 2 times daily 900 mL 0    multivitamin (OCUVITE) TABS per tablet Take 1 tablet by mouth daily 30 tablet 3    Benzoyl Peroxide 3.5 % LIQD Apply 1 Dose topically 2 times daily 1 Tube 0    ciclopirox (PENLAC) 8 % solution Apply topically nightly. 6 mL 0    albuterol sulfate HFA (PROAIR HFA) 108 (90 Base) MCG/ACT inhaler Inhale 2 puffs into the lungs every 4 hours as needed for Wheezing or Shortness of Breath (cough) 1 Inhaler 0     No current facility-administered medications for this visit.       Allergies   Allergen Reactions    Latex Itching    Aleve [Naproxen] Itching    Other      Allergy-Excedrin     Sulfa Antibiotics        Health Maintenance   Topic Date Due    Creatinine monitoring  1965    HIV screen  12/13/1980    Diabetes screen  12/13/2005 Encounter   Procedures    NÉSTOR DIAGNOSTIC W CAD BILATERAL     This order was created through External Result Entry     Orders Placed This Encounter   Medications    tretinoin (RETIN-A) 0.01 % gel     Sig: Apply topically nightly. Dispense:  45 g     Refill:  2    minocycline (MINOCIN;DYNACIN) 100 MG capsule     Sig: Take 1 capsule by mouth daily Take with food, but avoid dairy, calcium and MTV's 2 hours before and after the dose     Dispense:  30 capsule     Refill:  0    fluconazole (DIFLUCAN) 150 MG tablet     Sig: Take 1 tablet by mouth once for 1 dose     Dispense:  2 tablet     Refill:  0    Start with face wash and retina A gel nightly ; can use face wash iin AM as well. Use more gentle suncreen for sensitive skin like Neutrogena. If skin note improved or notice no change can  minocycline and will trial this for 1-2 months and re-evaluate. Recheck in one month and weight check . Call with questions or concerns. Patient given educational materials - see patient instructions. Discussed use, benefit, and side effects of prescribed medications. All patient questions answered. Pt voiced understanding. Reviewed health maintenance. Instructed to continue current medications, diet and exercise. Patient agreed with treatment plan. Follow up as directed.      Electronically signed by Larry Sotelo DO on 7/24/2018 at 11:41 AM

## 2018-08-06 ENCOUNTER — OFFICE VISIT (OUTPATIENT)
Dept: ORTHOPEDIC SURGERY | Age: 53
End: 2018-08-06
Payer: COMMERCIAL

## 2018-08-06 VITALS
SYSTOLIC BLOOD PRESSURE: 137 MMHG | HEART RATE: 84 BPM | HEIGHT: 65 IN | BODY MASS INDEX: 29.49 KG/M2 | DIASTOLIC BLOOD PRESSURE: 81 MMHG | WEIGHT: 177 LBS

## 2018-08-06 DIAGNOSIS — M17.0 PRIMARY OSTEOARTHRITIS OF BOTH KNEES: Primary | ICD-10-CM

## 2018-08-06 PROCEDURE — 20610 DRAIN/INJ JOINT/BURSA W/O US: CPT | Performed by: FAMILY MEDICINE

## 2018-08-06 RX ORDER — HYALURONATE SODIUM 10 MG/ML
20 SYRINGE (ML) INTRAARTICULAR ONCE
Status: COMPLETED | OUTPATIENT
Start: 2018-08-06 | End: 2018-08-06

## 2018-08-06 RX ADMIN — Medication 20 MG: at 09:19

## 2018-08-06 NOTE — PROGRESS NOTES
KNEE INJECTION PROCEDURE NOTE:  The patient was identified. The B/L knee was confirmed with the patient. After a sterile prep with Betadine followed by an alcohol wipe the knee was injected using a bent knee lateral joint line approach 2 mL of Euflexxa. Patient tolerated the procedure well without post injection complications. I instructed the patient to call our office immediately if they have any swelling or increased pain at the injection site.     This was injection 1 of 3 in the series    Follow-up 1w

## 2018-08-10 ENCOUNTER — OFFICE VISIT (OUTPATIENT)
Dept: ORTHOPEDIC SURGERY | Age: 53
End: 2018-08-10
Payer: COMMERCIAL

## 2018-08-10 VITALS
HEART RATE: 83 BPM | BODY MASS INDEX: 30.22 KG/M2 | WEIGHT: 177 LBS | HEIGHT: 64 IN | SYSTOLIC BLOOD PRESSURE: 135 MMHG | DIASTOLIC BLOOD PRESSURE: 80 MMHG

## 2018-08-10 DIAGNOSIS — M17.0 BILATERAL PRIMARY OSTEOARTHRITIS OF KNEE: Primary | ICD-10-CM

## 2018-08-10 PROCEDURE — 99213 OFFICE O/P EST LOW 20 MIN: CPT | Performed by: FAMILY MEDICINE

## 2018-08-10 RX ORDER — IBUPROFEN 800 MG/1
800 TABLET ORAL EVERY 8 HOURS PRN
Qty: 60 TABLET | Refills: 1 | Status: SHIPPED | OUTPATIENT
Start: 2018-08-10 | End: 2019-07-24 | Stop reason: HOSPADM

## 2018-08-10 NOTE — PROGRESS NOTES
Sports Medicine Consultation     CHIEF COMPLAINT:  Knee Pain (Right)      HPI:  Pete Mueller is a 46y.o. year old female who is a  established patient being seen for regarding recurrence of a previously resolved problem right knee pain. The pain has been present for 1 day(s). The patient recalls a no new injury. The patient has tried ice without improvement. The pain is described as sharp. There is  pain on weightbearing. The knee does swell. There is is not painful popping and clicking. The knee does not catch or lock. It has not given out. It is  stiff upon arising from sitting. It is  painful to go up and down stairs and sit for a prolonged period of time. she has a past medical history of Anemia; Anxiety; Cirrhosis of liver ([address] and Indigestion. she has a past surgical history that includes Liver surgery (2015). family history includes Cancer in her father; Heart Disease in her father; High Cholesterol in her mother; Migraines in her paternal grandmother. Social History     Social History    Marital status:      Spouse name: N/A    Number of children: N/A    Years of education: N/A     Occupational History    Not on file. Social History Main Topics    Smoking status: Never Smoker    Smokeless tobacco: Never Used    Alcohol use No    Drug use: No    Sexual activity: Yes     Partners: Male     Other Topics Concern    Not on file     Social History Narrative    No narrative on file       Current Outpatient Prescriptions   Medication Sig Dispense Refill    tretinoin (RETIN-A) 0.01 % gel Apply topically nightly.  45 g 2    minocycline (MINOCIN;DYNACIN) 100 MG capsule Take 1 capsule by mouth daily Take with food, but avoid dairy, calcium and MTV's 2 hours before and after the dose 30 capsule 0    rifaximin (XIFAXAN) 550 MG tablet Take 550 mg by mouth      Benzoyl Peroxide 3.5 % LIQD Apply 1 Dose topically 2 times daily 1 Tube 0    ciclopirox (PENLAC)

## 2018-08-17 ENCOUNTER — OFFICE VISIT (OUTPATIENT)
Dept: ORTHOPEDIC SURGERY | Age: 53
End: 2018-08-17
Payer: COMMERCIAL

## 2018-08-17 VITALS
DIASTOLIC BLOOD PRESSURE: 90 MMHG | HEART RATE: 87 BPM | BODY MASS INDEX: 30.22 KG/M2 | HEIGHT: 64 IN | WEIGHT: 177 LBS | SYSTOLIC BLOOD PRESSURE: 137 MMHG

## 2018-08-17 DIAGNOSIS — M17.0 PRIMARY OSTEOARTHRITIS OF BOTH KNEES: ICD-10-CM

## 2018-08-17 PROCEDURE — 20610 DRAIN/INJ JOINT/BURSA W/O US: CPT | Performed by: FAMILY MEDICINE

## 2018-08-17 PROCEDURE — 99213 OFFICE O/P EST LOW 20 MIN: CPT | Performed by: FAMILY MEDICINE

## 2018-08-17 RX ORDER — HYALURONATE SODIUM 10 MG/ML
20 SYRINGE (ML) INTRAARTICULAR ONCE
Status: COMPLETED | OUTPATIENT
Start: 2018-08-17 | End: 2018-08-17

## 2018-08-17 RX ADMIN — Medication 20 MG: at 10:08

## 2018-08-17 RX ADMIN — Medication 20 MG: at 10:06

## 2018-08-17 NOTE — PROGRESS NOTES
file.. Please be aware portions of this note were completed using voice recognition software and unforeseen errors may have occurred    Electronically signed by Robert Morgan DO, FAOASM  on 8/17/18 at 9:41 AM            Procedures    (2)  63475 - ME DRAIN/INJECT LARGE JOINT/BURSA       KNEE INJECTION PROCEDURE NOTE:  The patient was identified. The B/L knee was confirmed with the patient. After a sterile prep with Betadine followed by an alcohol wipe the knee was injected using a bent knee lateral joint line approach 2 mL of Euflexxa. Patient tolerated the procedure well without post injection complications. I instructed the patient to call our office immediately if they have any swelling or increased pain at the injection site.     This was injection 2 of 3 in the series    Follow-up 1w

## 2018-08-20 ENCOUNTER — OFFICE VISIT (OUTPATIENT)
Dept: FAMILY MEDICINE CLINIC | Age: 53
End: 2018-08-20
Payer: COMMERCIAL

## 2018-08-20 VITALS
DIASTOLIC BLOOD PRESSURE: 82 MMHG | RESPIRATION RATE: 16 BRPM | BODY MASS INDEX: 29.7 KG/M2 | WEIGHT: 173 LBS | TEMPERATURE: 98.3 F | HEART RATE: 78 BPM | OXYGEN SATURATION: 93 % | SYSTOLIC BLOOD PRESSURE: 130 MMHG

## 2018-08-20 DIAGNOSIS — R05.9 COUGH: ICD-10-CM

## 2018-08-20 DIAGNOSIS — Z13.1 ENCOUNTER FOR SCREENING FOR DIABETES MELLITUS: ICD-10-CM

## 2018-08-20 DIAGNOSIS — Z12.11 SCREENING FOR COLON CANCER: ICD-10-CM

## 2018-08-20 DIAGNOSIS — M25.561 ACUTE PAIN OF RIGHT KNEE: Primary | ICD-10-CM

## 2018-08-20 PROCEDURE — 99213 OFFICE O/P EST LOW 20 MIN: CPT | Performed by: INTERNAL MEDICINE

## 2018-08-20 RX ORDER — BENZONATATE 100 MG/1
100 CAPSULE ORAL 3 TIMES DAILY PRN
Qty: 30 CAPSULE | Refills: 0 | Status: SHIPPED | OUTPATIENT
Start: 2018-08-20 | End: 2018-10-30 | Stop reason: SDUPTHER

## 2018-08-20 ASSESSMENT — ENCOUNTER SYMPTOMS
STRIDOR: 0
SHORTNESS OF BREATH: 0
CHOKING: 0
CHEST TIGHTNESS: 0
COUGH: 1
DIARRHEA: 0
ABDOMINAL PAIN: 0
COLOR CHANGE: 0
WHEEZING: 0
CONSTIPATION: 0
BACK PAIN: 0

## 2018-08-20 NOTE — PROGRESS NOTES
Visit Information    Have you changed or started any medications since your last visit including any over-the-counter medicines, vitamins, or herbal medicines? no   Are you having any side effects from any of your medications? -  no  Have you stopped taking any of your medications? Is so, why? -  no    Have you seen any other physician or provider since your last visit? No  Have you had any other diagnostic tests since your last visit? No  Have you been seen in the emergency room and/or had an admission to a hospital since we last saw you? No  Have you had your routine dental cleaning in the past 6 months? no    Have you activated your [employer] account? If not, what are your barriers?  Yes     Patient Care Team:  Dominique Dowd DO as PCP - General (Family Medicine)    Medical History Review  Past Medical, Family, and Social History reviewed and does contribute to the patient presenting condition    Health Maintenance   Topic Date Due    Creatinine monitoring  1965    HIV screen  12/13/1980    Diabetes screen  12/13/2005    Shingles Vaccine (1 of 2 - 2 Dose Series) 12/13/2015    Colon cancer screen colonoscopy  12/13/2015    Cervical cancer screen  05/15/2018    Flu vaccine (1) 10/25/2018 (Originally 9/1/2018)    DTaP/Tdap/Td vaccine (1 - Tdap) 10/31/2018 (Originally 12/13/1984)    Pneumococcal med risk (1 of 1 - PPSV23) 10/31/2018 (Originally 12/13/1984)    Potassium monitoring  12/05/2018    Breast cancer screen  06/20/2020    Lipid screen  10/20/2020    Hepatitis C screen  Completed
LABMICR  No results found for: LDLCHOLESTEROL, LDLCALC    (goal LDL is <100)   No results found for: AST, ALT, BUN  BP Readings from Last 3 Encounters:   08/20/18 130/82   08/17/18 (!) 137/90   08/10/18 135/80          (goal 120/80)    Past Medical History:   Diagnosis Date    Anemia     Anxiety     Cirrhosis of liver (HCC)     Indigestion       Past Surgical History:   Procedure Laterality Date    LIVER SURGERY  2015       Family History   Problem Relation Age of Onset    Migraines Paternal Grandmother     High Cholesterol Mother     Cancer Father     Heart Disease Father        Social History   Substance Use Topics    Smoking status: Never Smoker    Smokeless tobacco: Never Used    Alcohol use No      Current Outpatient Prescriptions   Medication Sig Dispense Refill    ibuprofen (ADVIL;MOTRIN) 800 MG tablet Take 1 tablet by mouth every 8 hours as needed for Pain 60 tablet 1    tretinoin (RETIN-A) 0.01 % gel Apply topically nightly. 45 g 2    minocycline (MINOCIN;DYNACIN) 100 MG capsule Take 1 capsule by mouth daily Take with food, but avoid dairy, calcium and MTV's 2 hours before and after the dose 30 capsule 0    Benzoyl Peroxide 3.5 % LIQD Apply 1 Dose topically 2 times daily 1 Tube 0    ciclopirox (PENLAC) 8 % solution Apply topically nightly.  6 mL 0    amitriptyline (ELAVIL) 25 MG tablet Take 1 tablet by mouth nightly 90 tablet 3    CONSTULOSE 10 GM/15ML solution   0    furosemide (LASIX) 40 MG tablet Take 1 tablet by mouth daily 60 tablet 3    spironolactone (ALDACTONE) 100 MG tablet Take 1 tablet by mouth daily  0    Biotin 10 MG CAPS Take 10 mg by mouth      esomeprazole (NEXIUM) 20 MG capsule Take 1 capsule by mouth daily 30 capsule 3    lactulose encephalopathy (GENERLAC) 10 GM/15ML SOLN solution Take 15 mLs by mouth 2 times daily 900 mL 0    multivitamin (OCUVITE) TABS per tablet Take 1 tablet by mouth daily 30 tablet 3     No current facility-administered medications for this

## 2018-08-24 ENCOUNTER — OFFICE VISIT (OUTPATIENT)
Dept: ORTHOPEDIC SURGERY | Age: 53
End: 2018-08-24
Payer: COMMERCIAL

## 2018-08-24 VITALS — BODY MASS INDEX: 29.53 KG/M2 | HEIGHT: 64 IN | WEIGHT: 173 LBS

## 2018-08-24 DIAGNOSIS — M17.0 PRIMARY OSTEOARTHRITIS OF BOTH KNEES: Primary | ICD-10-CM

## 2018-08-24 PROCEDURE — 20610 DRAIN/INJ JOINT/BURSA W/O US: CPT | Performed by: FAMILY MEDICINE

## 2018-08-24 RX ORDER — HYALURONATE SODIUM 10 MG/ML
20 SYRINGE (ML) INTRAARTICULAR ONCE
Status: COMPLETED | OUTPATIENT
Start: 2018-08-24 | End: 2018-08-24

## 2018-08-24 RX ADMIN — Medication 20 MG: at 11:02

## 2018-08-24 RX ADMIN — Medication 20 MG: at 11:03

## 2018-08-30 ENCOUNTER — TELEPHONE (OUTPATIENT)
Dept: ORTHOPEDIC SURGERY | Age: 53
End: 2018-08-30

## 2018-09-06 ENCOUNTER — TELEPHONE (OUTPATIENT)
Dept: ORTHOPEDIC SURGERY | Age: 53
End: 2018-09-06

## 2018-09-06 NOTE — TELEPHONE ENCOUNTER
Francy Sow called today regarding brace for this pt - needs RX for OA brace , most recent office notes and demo sheet  thx

## 2018-09-20 ENCOUNTER — TELEPHONE (OUTPATIENT)
Dept: FAMILY MEDICINE CLINIC | Age: 53
End: 2018-09-20

## 2018-09-24 ENCOUNTER — OFFICE VISIT (OUTPATIENT)
Dept: FAMILY MEDICINE CLINIC | Age: 53
End: 2018-09-24
Payer: COMMERCIAL

## 2018-09-24 VITALS
RESPIRATION RATE: 16 BRPM | WEIGHT: 169 LBS | SYSTOLIC BLOOD PRESSURE: 112 MMHG | TEMPERATURE: 98.1 F | BODY MASS INDEX: 29.01 KG/M2 | DIASTOLIC BLOOD PRESSURE: 72 MMHG | HEART RATE: 87 BPM | OXYGEN SATURATION: 98 %

## 2018-09-24 DIAGNOSIS — K64.1 GRADE II HEMORRHOIDS: ICD-10-CM

## 2018-09-24 DIAGNOSIS — Z09 HOSPITAL DISCHARGE FOLLOW-UP: ICD-10-CM

## 2018-09-24 DIAGNOSIS — B86 SCABIES: Primary | ICD-10-CM

## 2018-09-24 PROCEDURE — 99213 OFFICE O/P EST LOW 20 MIN: CPT | Performed by: INTERNAL MEDICINE

## 2018-09-24 RX ORDER — PERMETHRIN 50 MG/G
CREAM TOPICAL
Qty: 60 G | Refills: 0 | Status: SHIPPED | OUTPATIENT
Start: 2018-09-24 | End: 2018-10-19 | Stop reason: ALTCHOICE

## 2018-09-24 ASSESSMENT — ENCOUNTER SYMPTOMS
DIARRHEA: 0
EYE PAIN: 0
VOMITING: 0
RHINORRHEA: 0
COLOR CHANGE: 1
NAUSEA: 0
SHORTNESS OF BREATH: 0
ABDOMINAL PAIN: 0
CONSTIPATION: 0
BLOOD IN STOOL: 1
ABDOMINAL DISTENTION: 0
ANAL BLEEDING: 0
COUGH: 0
RECTAL PAIN: 0
SORE THROAT: 0

## 2018-09-24 ASSESSMENT — PATIENT HEALTH QUESTIONNAIRE - PHQ9
2. FEELING DOWN, DEPRESSED OR HOPELESS: 0
1. LITTLE INTEREST OR PLEASURE IN DOING THINGS: 0
SUM OF ALL RESPONSES TO PHQ QUESTIONS 1-9: 0
SUM OF ALL RESPONSES TO PHQ QUESTIONS 1-9: 0
SUM OF ALL RESPONSES TO PHQ9 QUESTIONS 1 & 2: 0

## 2018-09-24 NOTE — PROGRESS NOTES
bruising, no burn, no ecchymosis, no laceration and no lesion noted. Rash is macular. She is not diaphoretic. There is erythema. No pallor. Psychiatric: She has a normal mood and affect. /72   Pulse 87   Temp 98.1 °F (36.7 °C)   Resp 16   Wt 169 lb (76.7 kg)   LMP  (Within Weeks)   SpO2 98%   BMI 29.01 kg/m²     Assessment:       Diagnosis Orders   1. Scabies     2. Grade II hemorrhoids     3. Hospital discharge follow-up               Plan:      No Follow-up on file. No orders of the defined types were placed in this encounter. Orders Placed This Encounter   Medications    permethrin (ELIMITE) 5 % cream     Sig: Apply topically as directed     Dispense:  60 g     Refill:  0    Rash has atypical appear acne as restricted to feet but does have the look of scabies and similar to when she had scabies before so will treat as such   Other differential /high on differential would be fleas given recent diagnosis in cat  Can use benadryl for the itching at night. Let us knw if rash does onto resolve or hemorrhoid/blood returns . Follow up if sxs worsen or persist.    Patient given educational materials - see patient instructions. Discussed use, benefit, and side effects of prescribed medications. All patient questions answered. Pt voiced understanding. Reviewed health maintenance. Instructed to continue current medications, diet and exercise. Patient agreed with treatment plan. Follow up as directed.      Electronically signed by Anderson Sun DO on 9/24/2018 at 11:54 AM

## 2018-10-05 ENCOUNTER — OFFICE VISIT (OUTPATIENT)
Dept: ORTHOPEDIC SURGERY | Age: 53
End: 2018-10-05
Payer: COMMERCIAL

## 2018-10-05 VITALS — WEIGHT: 169 LBS | BODY MASS INDEX: 28.85 KG/M2 | HEIGHT: 64 IN

## 2018-10-05 DIAGNOSIS — M17.0 BILATERAL PRIMARY OSTEOARTHRITIS OF KNEE: Primary | ICD-10-CM

## 2018-10-05 PROCEDURE — 99213 OFFICE O/P EST LOW 20 MIN: CPT | Performed by: FAMILY MEDICINE

## 2018-10-19 ENCOUNTER — OFFICE VISIT (OUTPATIENT)
Dept: FAMILY MEDICINE CLINIC | Age: 53
End: 2018-10-19
Payer: COMMERCIAL

## 2018-10-19 VITALS
BODY MASS INDEX: 28.68 KG/M2 | SYSTOLIC BLOOD PRESSURE: 116 MMHG | RESPIRATION RATE: 16 BRPM | OXYGEN SATURATION: 95 % | TEMPERATURE: 97.8 F | HEIGHT: 64 IN | WEIGHT: 168 LBS | DIASTOLIC BLOOD PRESSURE: 68 MMHG | HEART RATE: 92 BPM

## 2018-10-19 DIAGNOSIS — B86 SCABIES: Primary | ICD-10-CM

## 2018-10-19 PROCEDURE — 99213 OFFICE O/P EST LOW 20 MIN: CPT | Performed by: INTERNAL MEDICINE

## 2018-10-19 RX ORDER — TRIAMCINOLONE ACETONIDE 0.25 MG/G
CREAM TOPICAL
Qty: 80 G | Refills: 0 | Status: SHIPPED | OUTPATIENT
Start: 2018-10-19 | End: 2019-03-19

## 2018-10-19 RX ORDER — IVERMECTIN 3 MG/1
150 TABLET ORAL ONCE
Qty: 4 TABLET | Refills: 0 | Status: SHIPPED | OUTPATIENT
Start: 2018-10-19 | End: 2018-10-26 | Stop reason: SDUPTHER

## 2018-10-21 ASSESSMENT — ENCOUNTER SYMPTOMS
COLOR CHANGE: 1
VOMITING: 0
DIARRHEA: 0
COUGH: 0
CONSTIPATION: 0
RHINORRHEA: 0
NAIL CHANGES: 0
ABDOMINAL PAIN: 0

## 2018-10-21 NOTE — PROGRESS NOTES
Maintenance   Topic Date Due    Creatinine monitoring  1965    HIV screen  12/13/1980    Diabetes screen  12/13/2005    Colon cancer screen colonoscopy  12/13/2015    Cervical cancer screen  05/15/2018    Flu vaccine (1) 10/25/2018 (Originally 9/1/2018)    DTaP/Tdap/Td vaccine (1 - Tdap) 10/31/2018 (Originally 12/13/1984)    Pneumococcal med risk (1 of 1 - PPSV23) 10/31/2018 (Originally 12/13/1984)    Shingles Vaccine (1 of 2 - 2 Dose Series) 10/15/2019 (Originally 12/13/2015)    Potassium monitoring  12/05/2018    Breast cancer screen  06/20/2020    Lipid screen  10/20/2020    Hepatitis C screen  Completed       Subjective:     Review of Systems   Constitutional: Negative for fatigue, fever and unexpected weight change. HENT: Negative for congestion and rhinorrhea. Eyes: Negative for visual disturbance. Respiratory: Negative for cough. Cardiovascular: Negative for chest pain. Gastrointestinal: Negative for abdominal pain, anorexia, constipation, diarrhea and vomiting. Musculoskeletal: Negative for arthralgias. Skin: Positive for color change and rash. Negative for nail changes, pallor and wound. Neurological: Negative for headaches. Hematological: Negative for adenopathy. Does not bruise/bleed easily. Psychiatric/Behavioral: Negative for sleep disturbance. Objective:     Physical Exam   Constitutional: She is oriented to person, place, and time. She appears well-developed and well-nourished. No distress. HENT:   Right Ear: External ear normal.   Left Ear: External ear normal.   Nose: Nose normal.   Eyes: Pupils are equal, round, and reactive to light. EOM are normal.   Cardiovascular: Normal rate, regular rhythm and normal heart sounds. Pulmonary/Chest: Effort normal and breath sounds normal.   Abdominal: Soft. Bowel sounds are normal. There is no splenomegaly or hepatomegaly. There is no tenderness.    Neurological: She is alert and oriented to person, place, and time. No cranial nerve deficit. Skin: Skin is warm and dry. Capillary refill takes less than 2 seconds. Petechiae and rash noted. No lesion and no purpura noted. Rash is papular. Rash is not macular, not pustular and not urticarial. She is not diaphoretic. No erythema. Psychiatric: She has a normal mood and affect. Nursing note and vitals reviewed. /68 (Site: Left Lower Arm, Position: Sitting, Cuff Size: Medium Adult)   Pulse 92   Temp 97.8 °F (36.6 °C) (Tympanic)   Resp 16   Ht 5' 4\" (1.626 m)   Wt 168 lb (76.2 kg)   LMP  (Within Weeks)   SpO2 95%   BMI 28.84 kg/m²     Assessment:       Diagnosis Orders   1. Scabies               Plan:      No Follow-up on file. No orders of the defined types were placed in this encounter. Orders Placed This Encounter   Medications    ivermectin 3 MG tablet     Sig: Take 4 tablets by mouth once for 1 dose     Dispense:  4 tablet     Refill:  0    triamcinolone (KENALOG) 0.025 % cream     Sig: Apply topically 2 times daily. Dispense:  80 g     Refill:  0    Only to feet   Appearance of petechiae but platelets stable   Not diagnositc or typical appearacne of scabies   Did review up to date and no dosage adjustment or contraindication with cirrhosis so will do one time 4 dose course of the ivermectin today   Also did give kenalog cream thogh as not convinced this is scabies for pt to apply topically to rash and for itchign twice dialy   If not more than 50 percent beter in one week needs to call n ; could do one further ivermectin if improved . If not improved at all would need labs and dermatology referral which I reviewed with patient . Call with questions or concerns. Patientgiven educational materials - see patient instructions. Discussed use, benefit,and side effects of prescribed medications. All patient questions answered. Ptvoiced understanding. Reviewed health maintenance.   Instructed to continue currentmedications, diet and

## 2018-10-26 ENCOUNTER — TELEPHONE (OUTPATIENT)
Dept: FAMILY MEDICINE CLINIC | Age: 53
End: 2018-10-26

## 2018-10-26 RX ORDER — IVERMECTIN 3 MG/1
150 TABLET ORAL ONCE
Qty: 4 TABLET | Refills: 0 | Status: SHIPPED | OUTPATIENT
Start: 2018-10-26 | End: 2018-10-26

## 2018-10-29 DIAGNOSIS — R05.9 COUGH: ICD-10-CM

## 2018-10-29 RX ORDER — BENZONATATE 100 MG/1
100 CAPSULE ORAL 3 TIMES DAILY PRN
Qty: 30 CAPSULE | Refills: 0 | Status: CANCELLED | OUTPATIENT
Start: 2018-10-29 | End: 2018-11-05

## 2018-11-07 ENCOUNTER — OFFICE VISIT (OUTPATIENT)
Dept: FAMILY MEDICINE CLINIC | Age: 53
End: 2018-11-07
Payer: COMMERCIAL

## 2018-11-07 VITALS
RESPIRATION RATE: 12 BRPM | SYSTOLIC BLOOD PRESSURE: 138 MMHG | OXYGEN SATURATION: 95 % | DIASTOLIC BLOOD PRESSURE: 82 MMHG | BODY MASS INDEX: 28.34 KG/M2 | TEMPERATURE: 97.3 F | WEIGHT: 166 LBS | HEART RATE: 87 BPM | HEIGHT: 64 IN

## 2018-11-07 DIAGNOSIS — R21 RASH: ICD-10-CM

## 2018-11-07 DIAGNOSIS — D69.0: Primary | ICD-10-CM

## 2018-11-07 PROCEDURE — 99213 OFFICE O/P EST LOW 20 MIN: CPT | Performed by: INTERNAL MEDICINE

## 2018-11-07 RX ORDER — PERMETHRIN 50 MG/G
1 CREAM TOPICAL ONCE
Qty: 1 TUBE | Refills: 0 | Status: SHIPPED | OUTPATIENT
Start: 2018-11-07 | End: 2018-11-07

## 2018-11-07 RX ORDER — PERMETHRIN 50 MG/G
CREAM TOPICAL
COMMUNITY
Start: 2018-09-24 | End: 2018-11-07 | Stop reason: SDUPTHER

## 2018-11-07 RX ORDER — IVERMECTIN 3 MG/1
TABLET ORAL
COMMUNITY
Start: 2018-10-19 | End: 2019-03-19

## 2018-11-07 ASSESSMENT — ENCOUNTER SYMPTOMS
VOMITING: 0
NAIL CHANGES: 0
EYE PAIN: 0
COUGH: 0
COLOR CHANGE: 1
SHORTNESS OF BREATH: 0
RHINORRHEA: 0
SORE THROAT: 0
DIARRHEA: 0

## 2018-11-07 NOTE — PROGRESS NOTES
Visit Information    Have you changed or started any medications since your last visit including any over-the-counter medicines, vitamins, or herbal medicines? no   Are you having any side effects from any of your medications? -  no  Have you stopped taking any of your medications? Is so, why? -  no    Have you seen any other physician or provider since your last visit? No  Have you had any other diagnostic tests since your last visit? No  Have you been seen in the emergency room and/or had an admission to a hospital since we last saw you? No  Have you had your routine dental cleaning in the past 6 months? no    Have you activated your [facility] [employer] account? If not, what are your barriers?  Yes     Patient Care Team:  Huy Vicente DO as PCP - General (Family Medicine)    Medical History Review  Past Medical, Family, and Social History reviewed and does contribute to the patient presenting condition    Health Maintenance   Topic Date Due    Creatinine monitoring  1965    HIV screen  12/13/1980    DTaP/Tdap/Td vaccine (1 - Tdap) 12/13/1984    Pneumococcal med risk (1 of 1 - PPSV23) 12/13/1984    Diabetes screen  12/13/2005    Colon cancer screen colonoscopy  12/13/2015    Cervical cancer screen  05/15/2018    Flu vaccine (1) 09/01/2018    Shingles Vaccine (1 of 2 - 2 Dose Series) 10/15/2019 (Originally 12/13/2015)    Potassium monitoring  12/05/2018    Breast cancer screen  06/20/2020    Lipid screen  10/20/2020    Hepatitis C screen  Completed

## 2018-11-07 NOTE — PROGRESS NOTES
[address] [address]  Dept: [phone]  Dept Fax: [phone]    Lissy Began a 46 y.o. female who presents today for her medical conditions/complaints as notedbelow. Paloma Mcknight is c/o of   Chief Complaint   Patient presents with    Rash     rash on feet. per pt she thinks her scabies are back          HPI:     Pt used the ivermectin   Also washed the sheets   She states went a way a little   But she states she only used the permethcin cream once and only kept it on for 6 hours so she wants to try it again   No new sxs   Has lost another 2 lbs and is trying   Still trying to get botched breast augmentation corrected       Rash   This is a recurrent problem. The current episode started more than 1 month ago. The problem has been waxing and waning since onset. The affected locations include the left ankle and left foot. The rash is characterized by redness and itchiness. She was exposed to an insect bite/sting. Pertinent negatives include no anorexia, congestion, cough, diarrhea, eye pain, facial edema, fatigue, fever, joint pain, nail changes, rhinorrhea, shortness of breath, sore throat or vomiting. Past treatments include topical steroids and anti-itch cream. The treatment provided mild relief. There is no history of allergies, asthma, eczema or varicella.        No results found for: LABA1C      ( goal A1C is < 7)   No results found for: LABMICR  No results found for: LDLCHOLESTEROL, LDLCALC    (goal LDL is <100)   No results found for: AST, ALT, BUN  BP Readings from Last 3 Encounters:   11/07/18 138/82   10/19/18 116/68   09/24/18 112/72          (goal 120/80)    Past Medical History:   Diagnosis Date    Anemia     Anxiety     Cirrhosis of liver (HCC)     Indigestion       Past Surgical History:   Procedure Laterality Date    LIVER SURGERY  2015       Family History   Problem Relation Age of Onset

## 2018-12-01 ENCOUNTER — HOSPITAL ENCOUNTER (OUTPATIENT)
Age: 53
Setting detail: SPECIMEN
Discharge: HOME | End: 2018-12-01
Payer: COMMERCIAL

## 2018-12-01 ENCOUNTER — OFFICE VISIT (OUTPATIENT)
Dept: FAMILY MEDICINE CLINIC | Age: 53
End: 2018-12-01
Payer: COMMERCIAL

## 2018-12-01 VITALS
BODY MASS INDEX: 27.82 KG/M2 | RESPIRATION RATE: 18 BRPM | HEIGHT: 65 IN | HEART RATE: 83 BPM | TEMPERATURE: 97.9 F | WEIGHT: 167 LBS | OXYGEN SATURATION: 98 % | SYSTOLIC BLOOD PRESSURE: 126 MMHG | DIASTOLIC BLOOD PRESSURE: 74 MMHG

## 2018-12-01 DIAGNOSIS — R30.0 DYSURIA: Primary | ICD-10-CM

## 2018-12-01 DIAGNOSIS — R35.89 POLYURIA: ICD-10-CM

## 2018-12-01 LAB
BILIRUBIN, POC: NEGATIVE
BLOOD URINE, POC: NEGATIVE
CLARITY, POC: CLEAR
COLOR, POC: YELLOW
GLUCOSE URINE, POC: NEGATIVE
KETONES, POC: NEGATIVE
LEUKOCYTE EST, POC: NEGATIVE
NITRITE, POC: NEGATIVE
PH, POC: 7
PROTEIN, POC: NEGATIVE
SPECIFIC GRAVITY, POC: 1.01
UROBILINOGEN, POC: 0.2

## 2018-12-01 PROCEDURE — 81002 URINALYSIS NONAUTO W/O SCOPE: CPT | Performed by: INTERNAL MEDICINE

## 2018-12-01 PROCEDURE — 99213 OFFICE O/P EST LOW 20 MIN: CPT | Performed by: INTERNAL MEDICINE

## 2018-12-01 RX ORDER — CIPROFLOXACIN 500 MG/1
500 TABLET, FILM COATED ORAL 2 TIMES DAILY
Qty: 10 TABLET | Refills: 0 | Status: SHIPPED | OUTPATIENT
Start: 2018-12-01 | End: 2019-05-01 | Stop reason: SDUPTHER

## 2018-12-01 ASSESSMENT — ENCOUNTER SYMPTOMS
VOMITING: 0
NAUSEA: 0

## 2018-12-01 NOTE — PROGRESS NOTES
CONSTULOSE 10 GM/15ML solution   0    spironolactone (ALDACTONE) 100 MG tablet Take 1 tablet by mouth daily  0    Biotin 10 MG CAPS Take 10 mg by mouth      esomeprazole (NEXIUM) 20 MG capsule Take 1 capsule by mouth daily 30 capsule 3    lactulose encephalopathy (GENERLAC) 10 GM/15ML SOLN solution Take 15 mLs by mouth 2 times daily 900 mL 0    multivitamin (OCUVITE) TABS per tablet Take 1 tablet by mouth daily 30 tablet 3     No current facility-administered medications for this visit. Allergies   Allergen Reactions    Latex Itching    Aleve [Naproxen] Itching    Other      Allergy-Excedrin     Sulfa Antibiotics        Subjective:     Review of Systems   Constitutional: Positive for chills. Gastrointestinal: Negative for nausea and vomiting. Genitourinary: Positive for flank pain, frequency and urgency. Negative for hematuria and hesitancy. Objective:     Physical Exam   Constitutional: She appears well-developed and well-nourished. Cardiovascular: Normal rate, regular rhythm and normal heart sounds. Exam reveals no gallop and no friction rub. No murmur heard. Pulmonary/Chest: Effort normal and breath sounds normal. No respiratory distress. She has no wheezes. Abdominal: Soft. Bowel sounds are normal. She exhibits no mass. There is tenderness (suprapubic ). There is no guarding. Musculoskeletal: Normal range of motion. Neurological: She is alert. Nursing note and vitals reviewed. /74 (Site: Left Upper Arm, Position: Sitting, Cuff Size: Large Adult)   Pulse 83   Temp 97.9 °F (36.6 °C) (Tympanic)   Resp 18   Ht 5' 5.46\" (1.663 m)   Wt 167 lb (75.8 kg)   LMP  (Within Weeks)   SpO2 98%   BMI 27.40 kg/m²     Assessment:       Diagnosis Orders   1. Dysuria  Urine Culture    ciprofloxacin (CIPRO) 500 MG tablet   2. Polyuria  POCT Urinalysis no Micro       Plan:      No Follow-up on file.     Orders Placed This Encounter   Medications    ciprofloxacin (CIPRO) 500 MG tablet     Sig: Take 1 tablet by mouth 2 times daily for 5 days     Dispense:  10 tablet     Refill:  0         Patient given educational materials - see patientinstructions. Discussed use, benefit, and side effects of prescribed medications. All patient questions answered. Pt voiced understanding.

## 2018-12-02 LAB
CULTURE: NO GROWTH
Lab: NORMAL
SPECIMEN DESCRIPTION: NORMAL
STATUS: NORMAL

## 2019-01-26 ENCOUNTER — OFFICE VISIT (OUTPATIENT)
Dept: FAMILY MEDICINE CLINIC | Age: 54
End: 2019-01-26
Payer: COMMERCIAL

## 2019-01-26 VITALS
SYSTOLIC BLOOD PRESSURE: 129 MMHG | RESPIRATION RATE: 18 BRPM | HEART RATE: 101 BPM | DIASTOLIC BLOOD PRESSURE: 72 MMHG | BODY MASS INDEX: 26.99 KG/M2 | WEIGHT: 162 LBS | TEMPERATURE: 97.6 F | HEIGHT: 65 IN | OXYGEN SATURATION: 97 %

## 2019-01-26 DIAGNOSIS — J01.90 ACUTE BACTERIAL SINUSITIS: Primary | ICD-10-CM

## 2019-01-26 DIAGNOSIS — B96.89 ACUTE BACTERIAL SINUSITIS: Primary | ICD-10-CM

## 2019-01-26 PROCEDURE — 99213 OFFICE O/P EST LOW 20 MIN: CPT | Performed by: NURSE PRACTITIONER

## 2019-01-26 RX ORDER — BENZONATATE 100 MG/1
100 CAPSULE ORAL 3 TIMES DAILY PRN
Qty: 21 CAPSULE | Refills: 0 | Status: SHIPPED | OUTPATIENT
Start: 2019-01-26 | End: 2020-06-10 | Stop reason: SDUPTHER

## 2019-01-26 RX ORDER — CLARITHROMYCIN 500 MG/1
500 TABLET, COATED ORAL 2 TIMES DAILY
Qty: 20 TABLET | Refills: 0 | Status: SHIPPED | OUTPATIENT
Start: 2019-01-26 | End: 2019-02-05

## 2019-01-26 RX ORDER — FLUCONAZOLE 150 MG/1
150 TABLET ORAL DAILY
Qty: 2 TABLET | Refills: 0 | Status: SHIPPED | OUTPATIENT
Start: 2019-01-26 | End: 2019-05-02 | Stop reason: SDUPTHER

## 2019-01-26 RX ORDER — FLUTICASONE PROPIONATE 50 MCG
2 SPRAY, SUSPENSION (ML) NASAL DAILY
Qty: 1 BOTTLE | Refills: 0 | Status: SHIPPED | OUTPATIENT
Start: 2019-01-26 | End: 2019-11-08 | Stop reason: SDUPTHER

## 2019-01-26 ASSESSMENT — ENCOUNTER SYMPTOMS
COUGH: 1
SINUS PRESSURE: 1

## 2019-02-16 ENCOUNTER — HOSPITAL ENCOUNTER (OUTPATIENT)
Age: 54
Setting detail: SPECIMEN
Discharge: HOME | End: 2019-02-16
Payer: COMMERCIAL

## 2019-02-16 ENCOUNTER — OFFICE VISIT (OUTPATIENT)
Dept: FAMILY MEDICINE CLINIC | Age: 54
End: 2019-02-16
Payer: COMMERCIAL

## 2019-02-16 VITALS
OXYGEN SATURATION: 96 % | WEIGHT: 163 LBS | SYSTOLIC BLOOD PRESSURE: 122 MMHG | BODY MASS INDEX: 27.16 KG/M2 | TEMPERATURE: 98.3 F | HEIGHT: 65 IN | HEART RATE: 87 BPM | DIASTOLIC BLOOD PRESSURE: 66 MMHG | RESPIRATION RATE: 18 BRPM

## 2019-02-16 DIAGNOSIS — R31.9 URINARY TRACT INFECTION WITH HEMATURIA, SITE UNSPECIFIED: Primary | ICD-10-CM

## 2019-02-16 DIAGNOSIS — R35.89 POLYURIA: ICD-10-CM

## 2019-02-16 DIAGNOSIS — N39.0 URINARY TRACT INFECTION WITH HEMATURIA, SITE UNSPECIFIED: Primary | ICD-10-CM

## 2019-02-16 DIAGNOSIS — R31.9 URINARY TRACT INFECTION WITH HEMATURIA, SITE UNSPECIFIED: ICD-10-CM

## 2019-02-16 DIAGNOSIS — N39.0 URINARY TRACT INFECTION WITH HEMATURIA, SITE UNSPECIFIED: ICD-10-CM

## 2019-02-16 LAB
BILIRUBIN, POC: NEGATIVE
BLOOD URINE, POC: ABNORMAL
CLARITY, POC: ABNORMAL
COLOR, POC: YELLOW
GLUCOSE URINE, POC: NEGATIVE
KETONES, POC: NEGATIVE
LEUKOCYTE EST, POC: ABNORMAL
NITRITE, POC: POSITIVE
PH, POC: 7
PROTEIN, POC: ABNORMAL
SPECIFIC GRAVITY, POC: 1.01
UROBILINOGEN, POC: 0.2

## 2019-02-16 PROCEDURE — 99213 OFFICE O/P EST LOW 20 MIN: CPT | Performed by: NURSE PRACTITIONER

## 2019-02-16 PROCEDURE — 81002 URINALYSIS NONAUTO W/O SCOPE: CPT | Performed by: NURSE PRACTITIONER

## 2019-02-16 RX ORDER — PHENAZOPYRIDINE HYDROCHLORIDE 200 MG/1
200 TABLET, FILM COATED ORAL 3 TIMES DAILY PRN
Qty: 9 TABLET | Refills: 0 | Status: SHIPPED | OUTPATIENT
Start: 2019-02-16 | End: 2019-05-01 | Stop reason: SDUPTHER

## 2019-02-16 RX ORDER — CEPHALEXIN 500 MG/1
500 CAPSULE ORAL 2 TIMES DAILY
Qty: 14 CAPSULE | Refills: 0 | Status: SHIPPED | OUTPATIENT
Start: 2019-02-16 | End: 2019-02-23

## 2019-02-16 ASSESSMENT — ENCOUNTER SYMPTOMS
VOMITING: 0
DIARRHEA: 0
CHEST TIGHTNESS: 0
SHORTNESS OF BREATH: 0
NAUSEA: 1
CONSTIPATION: 0
COUGH: 0
RESPIRATORY NEGATIVE: 1
ABDOMINAL PAIN: 0
WHEEZING: 0

## 2019-02-16 ASSESSMENT — PATIENT HEALTH QUESTIONNAIRE - PHQ9
SUM OF ALL RESPONSES TO PHQ QUESTIONS 1-9: 0
SUM OF ALL RESPONSES TO PHQ9 QUESTIONS 1 & 2: 0
SUM OF ALL RESPONSES TO PHQ QUESTIONS 1-9: 0
2. FEELING DOWN, DEPRESSED OR HOPELESS: 0
1. LITTLE INTEREST OR PLEASURE IN DOING THINGS: 0

## 2019-02-20 LAB
CULTURE: ABNORMAL
Lab: ABNORMAL
SPECIMEN DESCRIPTION: ABNORMAL

## 2019-03-19 ENCOUNTER — OFFICE VISIT (OUTPATIENT)
Dept: FAMILY MEDICINE CLINIC | Age: 54
End: 2019-03-19
Payer: COMMERCIAL

## 2019-03-19 VITALS
TEMPERATURE: 96.5 F | DIASTOLIC BLOOD PRESSURE: 72 MMHG | RESPIRATION RATE: 16 BRPM | HEART RATE: 80 BPM | OXYGEN SATURATION: 92 % | SYSTOLIC BLOOD PRESSURE: 126 MMHG | WEIGHT: 160.4 LBS | HEIGHT: 65 IN | BODY MASS INDEX: 26.73 KG/M2

## 2019-03-19 DIAGNOSIS — Z12.11 SCREENING FOR COLON CANCER: ICD-10-CM

## 2019-03-19 DIAGNOSIS — R22.1 NECK MASS: Primary | ICD-10-CM

## 2019-03-19 DIAGNOSIS — Z23 NEED FOR TDAP VACCINATION: ICD-10-CM

## 2019-03-19 DIAGNOSIS — Z13.1 ENCOUNTER FOR SCREENING FOR DIABETES MELLITUS: ICD-10-CM

## 2019-03-19 PROCEDURE — 90715 TDAP VACCINE 7 YRS/> IM: CPT | Performed by: INTERNAL MEDICINE

## 2019-03-19 PROCEDURE — 99213 OFFICE O/P EST LOW 20 MIN: CPT | Performed by: INTERNAL MEDICINE

## 2019-03-19 PROCEDURE — 90471 IMMUNIZATION ADMIN: CPT | Performed by: INTERNAL MEDICINE

## 2019-03-19 ASSESSMENT — ENCOUNTER SYMPTOMS
CHEST TIGHTNESS: 0
DIARRHEA: 0
ABDOMINAL PAIN: 0
WHEEZING: 0
STRIDOR: 0
SHORTNESS OF BREATH: 0
CONSTIPATION: 0

## 2019-03-21 ENCOUNTER — TELEPHONE (OUTPATIENT)
Dept: FAMILY MEDICINE CLINIC | Age: 54
End: 2019-03-21

## 2019-03-21 DIAGNOSIS — R22.1 NECK MASS: ICD-10-CM

## 2019-04-19 ENCOUNTER — OFFICE VISIT (OUTPATIENT)
Dept: ORTHOPEDIC SURGERY | Age: 54
End: 2019-04-19
Payer: COMMERCIAL

## 2019-04-19 VITALS
SYSTOLIC BLOOD PRESSURE: 122 MMHG | HEIGHT: 64 IN | DIASTOLIC BLOOD PRESSURE: 78 MMHG | BODY MASS INDEX: 25.61 KG/M2 | HEART RATE: 64 BPM | WEIGHT: 150 LBS

## 2019-04-19 DIAGNOSIS — M65.831 EXTENSOR TENOSYNOVITIS OF RIGHT WRIST: Primary | ICD-10-CM

## 2019-04-19 PROCEDURE — 99213 OFFICE O/P EST LOW 20 MIN: CPT | Performed by: FAMILY MEDICINE

## 2019-04-19 NOTE — PROGRESS NOTES
Sports Medicine Consultation    CHIEF COMPLAINT:  Wrist Pain (New condition. Right wrist pain. Pt states got [employer] in[employer]zpy from 12/2018. Might have hurt it [facility] [address] [place], [place] [address] level 10.)      HPI:  The patient is a 48 y.o. female who is being seen for   established patient being seen for regarding new problem of right wrist ulnar side pain. The patient is a right hand dominant female who has had right hand/wrist pain for 4-6 weeks. As far as trauma to the hand the patient indicates pain with walking larger dog. The following medications/interventions have been tried: massage without benefit.      she has a past medical history of Anemia, Anxiety, Cirrhosis of liver ([address] and Indigestion. she has a past surgical history that includes Liver surgery (2015). family history includes Cancer in her father; Heart Disease in her father; High Cholesterol in her mother; Migraines in her paternal grandmother.     Social History     Socioeconomic History    Marital status:      Spouse name: Not on file    Number of children: Not on file    Years of education: Not on file    Highest education level: Not on file   Occupational History    Not on file   Social Needs    Financial resource strain: Not on file    Food insecurity:     Worry: Not on file     Inability: Not on file    Transportation needs:     Medical: Not on file     Non-medical: Not on file   Tobacco Use    Smoking status: Never Smoker    Smokeless tobacco: Never Used   Substance and Sexual Activity    Alcohol use: No     Alcohol/week: 0.0 oz    Drug use: No    Sexual activity: Yes     Partners: Male   Lifestyle    Physical activity:     Days per week: Not on file     Minutes per session: Not on file    Stress: Not on file   Relationships    Social connections:     Talks on phone: Not on file     Gets together: Not on file     Attends Taoist service: Not on file     Active member of club or organization: Not on file Attends meetings of clubs or organizations: Not on file     Relationship status: Not on file    Intimate partner violence:     Fear of current or ex partner: Not on file     Emotionally abused: Not on file     Physically abused: Not on file     Forced sexual activity: Not on file   Other Topics Concern    Not on file   Social History Narrative    Not on file       Current Outpatient Medications   Medication Sig Dispense Refill    fluticasone (FLONASE) 50 MCG/ACT nasal spray 2 sprays by Nasal route daily 1 Bottle 0    ibuprofen (ADVIL;MOTRIN) 800 MG tablet Take 1 tablet by mouth every 8 hours as needed for Pain 60 tablet 1    amitriptyline (ELAVIL) 25 MG tablet Take 1 tablet by mouth nightly 90 tablet 3    spironolactone (ALDACTONE) 100 MG tablet Take 1 tablet by mouth daily  0    Biotin 10 MG CAPS Take 10 mg by mouth      esomeprazole (NEXIUM) 20 MG capsule Take 1 capsule by mouth daily 30 capsule 3    lactulose encephalopathy (GENERLAC) 10 GM/15ML SOLN solution Take 15 mLs by mouth 2 times daily 900 mL 0    multivitamin (OCUVITE) TABS per tablet Take 1 tablet by mouth daily 30 tablet 3    Misc. Devices MISC 1 each by Does not apply route once for 1 dose OA reaction brace for the right knee. Wear brace as directed by physician. Apply to right knee 1 Device 0     No current facility-administered medications for this visit. Allergies:  sheis allergic to latex; aleve [naproxen]; other; and sulfa antibiotics. ROS:  CV:  Denies chest pain; palpitations; shortness of breath; swelling of feet, ankles; and loss of consciousness. CON: Denies fever and dizziness. ENT:  Denies hearing loss / ringing, ear infections hoarseness, and swallowing problems. RESP:  Denies chronic cough, spitting up blood, and asthma/wheezing. GI: Denies abdominal pain, change in bowel habits, nausea or vomiting, and blood in stools.   :  Denies frequent urination, burning or painful urination, blood in the urine, and bladder incontinence. NEURO:  Denies headache, memory loss, sleep disturbance, and tremor or movement disorder. PHYSICAL EXAM:    SKIN:  Intact without rashes, lesions or ulcerations. No obvious deformity or swelling. EYES:  Extraocular muscles intact. MOUTH: Oral mucosa moist.  No perioral lesions. PULM:  Respirations unlabored and regular. VASC:  Capillary refill less than 2 seconds. Hand/Wrist Exam  ROM:  Full flexor and extensor tendon function intact   Finger, hand, and wrist range of motion are WNL  Inspection-Deformity: no  Palpation-Tenderness: ulnar styloid, tfcc  There is is not thenar and is not interosseous atrophy. Strength- WNL  NEURO: Radial, ulnar, and median nerves are intact to motor and sensory testing. Two point discrimination is less than six mm. There is not decreased sensation to light touch and pinprick in the median and ulnar nerve distribution. CTS: Tinel's test at the wrist is negative. Flexion compression test negative  Phalen's test is negative. Finkelstein's test is negative. Elbow:  Range of motion and strength about the elbow is intact. Tinel's test is negative at the elbow. Cerv:  Full pain free range of motion with a negative Spurling's test.    RADIOLOGY: No results found. 3 views of the right wrist were ordered,  independently visualized by me, and discussed with patient. Findings: Radiographs of the right wrist failed to demonstrate any osseous abnormalities. IMPRESSION:     1. Extensor tenosynovitis of right wrist        PLAN:   We discussed some of the etiologies and natural histories of     ICD-10-CM    1. Extensor tenosynovitis of right wrist M65.841      We discussed the various treatment alternatives including anti-inflammatory medications, physical therapy, injections, further imaging studies and as a last resort surgery.   At this point I do think the patient would benefit from a wrist widget type brace we did help her or one in

## 2019-05-01 ENCOUNTER — OFFICE VISIT (OUTPATIENT)
Dept: FAMILY MEDICINE CLINIC | Age: 54
End: 2019-05-01
Payer: COMMERCIAL

## 2019-05-01 VITALS
SYSTOLIC BLOOD PRESSURE: 116 MMHG | DIASTOLIC BLOOD PRESSURE: 70 MMHG | RESPIRATION RATE: 16 BRPM | HEIGHT: 64 IN | HEART RATE: 83 BPM | OXYGEN SATURATION: 99 % | WEIGHT: 164 LBS | BODY MASS INDEX: 28 KG/M2 | TEMPERATURE: 96.6 F

## 2019-05-01 DIAGNOSIS — N30.90 CYSTITIS: Primary | ICD-10-CM

## 2019-05-01 DIAGNOSIS — R31.9 URINARY TRACT INFECTION WITH HEMATURIA, SITE UNSPECIFIED: ICD-10-CM

## 2019-05-01 DIAGNOSIS — R30.0 DYSURIA: ICD-10-CM

## 2019-05-01 DIAGNOSIS — N39.0 URINARY TRACT INFECTION WITH HEMATURIA, SITE UNSPECIFIED: ICD-10-CM

## 2019-05-01 PROCEDURE — 99213 OFFICE O/P EST LOW 20 MIN: CPT | Performed by: INTERNAL MEDICINE

## 2019-05-01 RX ORDER — CIPROFLOXACIN 500 MG/1
500 TABLET, FILM COATED ORAL 2 TIMES DAILY
Qty: 10 TABLET | Refills: 0 | Status: SHIPPED | OUTPATIENT
Start: 2019-05-01 | End: 2019-08-10 | Stop reason: SDUPTHER

## 2019-05-01 RX ORDER — PHENAZOPYRIDINE HYDROCHLORIDE 200 MG/1
200 TABLET, FILM COATED ORAL 3 TIMES DAILY PRN
Qty: 9 TABLET | Refills: 0 | Status: CANCELLED | OUTPATIENT
Start: 2019-05-01 | End: 2019-05-04

## 2019-05-01 RX ORDER — PHENAZOPYRIDINE HYDROCHLORIDE 200 MG/1
200 TABLET, FILM COATED ORAL 3 TIMES DAILY PRN
Qty: 9 TABLET | Refills: 0 | Status: SHIPPED | OUTPATIENT
Start: 2019-05-01 | End: 2019-08-10 | Stop reason: SDUPTHER

## 2019-05-01 ASSESSMENT — ENCOUNTER SYMPTOMS
BACK PAIN: 0
RECTAL PAIN: 0
SORE THROAT: 0
DIARRHEA: 0
CHEST TIGHTNESS: 0
ABDOMINAL PAIN: 0
VOMITING: 0
CONSTIPATION: 0
SHORTNESS OF BREATH: 0
NAUSEA: 0

## 2019-05-01 NOTE — PROGRESS NOTES
The patient, Lucio Lozano, identity was verified by her {HSP GEN PED :[id number]} using her {HSP GEN PT IDENTIFIER:[id number]}. Chief Complaint   Patient presents with    Vaginitis     vaginal buring/ inching. Medication list reviewed and active medications noted. Allergies, and tobacco history reviewed. Diversity questionnaire complete: {YES / MU:67986}  M-CHAT questionnaire given: {YES / M}  ASQ-3 Screening Questionnaire given: {YES / ZV:30945}  Pediatric screening questionnaire given: {YES / AO:98165}  Family history questionnaire given: {YES / VS:29455}  Immunizations were reviewed: {HSP GEN IMMUNIZATION ZQ:[id number]}  Varicella status reviewed: {HSP GEN VARICELLA ODCEJU:[id number]}  Has the patient seen a provider outside of [facility] since their last visit?  {YES / RM:77497}  Chaperone offered: { :29554}  Chaperone was: { :[id number]}  Identity of the Chaperone: *** { :[id number]}  If Chaperone was not available, discussion and outcome were: { :[id number]}  Recall for WLC/PE entered for ***/ scheduled on ***

## 2019-05-02 ENCOUNTER — TELEPHONE (OUTPATIENT)
Dept: ADMINISTRATIVE | Age: 54
End: 2019-05-02

## 2019-05-02 RX ORDER — FLUCONAZOLE 150 MG/1
150 TABLET ORAL DAILY
Qty: 2 TABLET | Refills: 0 | Status: SHIPPED | OUTPATIENT
Start: 2019-05-02 | End: 2019-05-04

## 2019-05-02 NOTE — TELEPHONE ENCOUNTER
Patient is calling to say her symptoms are not any better. She was told to call back and that the doctor would giver her diflucan.

## 2019-05-02 NOTE — PROGRESS NOTES
Diabetes screen  12/13/2005    Cervical cancer screen  05/15/2018    Potassium monitoring  12/05/2018    Shingles Vaccine (1 of 2) 10/15/2019 (Originally 12/13/2015)    Pneumococcal 0-64 years Vaccine (1 of 1 - PPSV23) 04/23/2020 (Originally [birth year]/1971)    Flu vaccine (Season Ended) 09/01/2019    Breast cancer screen  06/20/2020    Lipid screen  10/20/2020    Colon cancer screen fecal DNA test (Cologuard)  04/16/2022    DTaP/Tdap/Td vaccine (2 - Td) 03/19/2029    Hepatitis C screen  Completed       Subjective:     Review of Systems   Constitutional: Positive for activity change. Negative for appetite change, chills, diaphoresis, fatigue, fever and unexpected weight change. HENT: Negative for sore throat. Eyes: Negative for visual disturbance. Respiratory: Negative for chest tightness and shortness of breath. Cardiovascular: Negative for chest pain, palpitations and leg swelling. Gastrointestinal: Negative for abdominal pain, anorexia, constipation, diarrhea, nausea, rectal pain and vomiting. Genitourinary: Positive for difficulty urinating, dyspareunia, dysuria, pelvic pain, urgency and vaginal pain. Negative for decreased urine volume, enuresis, flank pain, frequency, genital sores, hematuria, menstrual problem, vaginal bleeding and vaginal discharge. Musculoskeletal: Negative for arthralgias, back pain and joint pain. Skin: Negative for rash. Neurological: Negative for headaches. Hematological: Negative for adenopathy. Does not bruise/bleed easily. Psychiatric/Behavioral: Negative for behavioral problems and sleep disturbance. Objective:      Physical Exam   Constitutional: She is oriented to person, place, and time. She appears well-developed and well-nourished. No distress. Cardiovascular: Normal rate, regular rhythm and normal heart sounds. Pulmonary/Chest: Effort normal and breath sounds normal.   Abdominal: Soft.  Bowel sounds are normal. There is no splenomegaly or hepatomegaly. There is tenderness in the suprapubic area. There is no rigidity, no guarding and no CVA tenderness. Neurological: She is alert and oriented to person, place, and time. Skin: Skin is warm and dry. She is not diaphoretic. Nursing note and vitals reviewed. /70 (Site: Right Upper Arm, Position: Sitting, Cuff Size: Medium Adult)   Pulse 83   Temp 96.6 °F (35.9 °C) (Tympanic)   Resp 16   Ht 5' 4\" (1.626 m)   Wt 164 lb (74.4 kg)   LMP  (Within Weeks)   SpO2 99%   BMI 28.15 kg/m²     Assessment:       Diagnosis Orders   1. Cystitis  Urine Culture   2. Urinary tract infection with hematuria, site unspecified  phenazopyridine (PYRIDIUM) 200 MG tablet   3. Dysuria  ciprofloxacin (CIPRO) 500 MG tablet             Plan:       Return if symptoms worsen or fail to improve. Orders Placed This Encounter   Procedures    Urine Culture     Standing Status:   Future     Standing Expiration Date:   5/1/2020     Order Specific Question:   Specify (ex-cath, midstream, cysto, etc)? Answer:   midstream     Orders Placed This Encounter   Medications    phenazopyridine (PYRIDIUM) 200 MG tablet     Sig: Take 1 tablet by mouth 3 times daily as needed for Pain     Dispense:  9 tablet     Refill:  0    ciprofloxacin (CIPRO) 500 MG tablet     Sig: Take 1 tablet by mouth 2 times daily for 5 days     Dispense:  10 tablet     Refill:  0    Will send for culture . Appears to be culture based on UA in office. Macrobid per pt was ineffective last UTI//course so will do cipro for 7 days . Pyridium as needed for next day to two   Given itching as well advised to do monistat OTC  today and do for the next 2-3 days as well. Follow up if sxs worsen or persist   Reschedule pap once all sxs resolve. Patientgiven educational materials - see patient instructions. Discussed use, benefit,and side effects of prescribed medications. All patient questions answered. Ptvoiced understanding.  Reviewed health maintenance. Instructed to continue currentmedications, diet and exercise. Patient agreed with treatment plan. Follow up asdirected.      Electronically signed by Adriana Teran DO on 5/1/2019 at 10:10 PM

## 2019-07-14 ENCOUNTER — TELEPHONE (OUTPATIENT)
Dept: FAMILY MEDICINE CLINIC | Age: 54
End: 2019-07-14

## 2019-07-14 NOTE — TELEPHONE ENCOUNTER
Pt came into the walk-in on Sunday. Pt was here stating that her rehab that she is in will not do anything for her besides feed her. Pt also came in c/o of right leg swelling and pain. Pt was advised to go to to [facility] er. Pt seemed scared. Pt was here with her . Pt kept saying she wanted to leave and get it checked out and pt  kept talking and raising his voice. Pt was told we will put a message back to Dr. Linda Lubin and see if we can refer to a new rehab facility. Please try [facility] rehabilitation center please. Please advise.

## 2019-07-15 ENCOUNTER — TELEPHONE (OUTPATIENT)
Dept: FAMILY MEDICINE CLINIC | Age: 54
End: 2019-07-15

## 2019-07-15 DIAGNOSIS — S92.909D CLOSED FRACTURE OF FOOT WITH ROUTINE HEALING, UNSPECIFIED LATERALITY, SUBSEQUENT ENCOUNTER: ICD-10-CM

## 2019-07-15 DIAGNOSIS — I63.89 CEREBROVASCULAR ACCIDENT (CVA) DUE TO OTHER MECHANISM (HCC): Primary | ICD-10-CM

## 2019-07-15 DIAGNOSIS — M25.561 ACUTE PAIN OF RIGHT KNEE: ICD-10-CM

## 2019-07-15 DIAGNOSIS — R53.1 WEAKNESS: ICD-10-CM

## 2019-07-15 NOTE — TELEPHONE ENCOUNTER
I spoke to [facility] we will wait for Dr. Umu Taylor

## 2019-07-15 NOTE — TELEPHONE ENCOUNTER
Patient's  called stating pt is in rehab for stroke and is getting no help. States he cannot discharge her without getting in trouble?  They are asking for a new referral. Please advise thank you

## 2019-07-18 NOTE — TELEPHONE ENCOUNTER
Called pt and voice mail box is not set up yet. I faxed order to new rehab we are waiting for what the next step will be.

## 2019-07-20 NOTE — TELEPHONE ENCOUNTER
came into walk in today without pt asking for pt to have gabapentin sent over to [facility]. Medication is not in med list. From what Im understanding, rehab has been taking care of her medications.  states rehab faxed over refills to [facility] but didn't fax gabapentin. Pt's  has been trying to call the on call doc and hasn't gotten an answer. I told him I would put a message back to you and that there is no guarantee that anything will be sent but to keep trying on call. I told  if he gets an answer and has the medication sent somewhere to give us a call and let us know so we don't send it over as well.  voiced understanding. Pt was not able to come in with him,  states she is in rehab in a lot of pain crying.  Please advise thank you

## 2019-07-22 RX ORDER — GABAPENTIN 300 MG/1
300 CAPSULE ORAL 3 TIMES DAILY
Qty: 180 CAPSULE | Refills: 3 | Status: SHIPPED | OUTPATIENT
Start: 2019-07-22 | End: 2019-09-04 | Stop reason: SDUPTHER

## 2019-07-22 NOTE — TELEPHONE ENCOUNTER
Patient called to check status of medication. Patient and her  spoke on the phone stating she has been in pain since Friday and the rehab could not release the pain medication she needs. I advised a message has been put in and it is pending for Dr Caroline Bagley to view. Please call patient back with an update.

## 2019-07-24 ENCOUNTER — OFFICE VISIT (OUTPATIENT)
Dept: FAMILY MEDICINE CLINIC | Age: 54
End: 2019-07-24
Payer: COMMERCIAL

## 2019-07-24 VITALS
BODY MASS INDEX: 28.15 KG/M2 | TEMPERATURE: 96.8 F | OXYGEN SATURATION: 97 % | RESPIRATION RATE: 16 BRPM | HEART RATE: 90 BPM | DIASTOLIC BLOOD PRESSURE: 76 MMHG | HEIGHT: 64 IN | SYSTOLIC BLOOD PRESSURE: 122 MMHG

## 2019-07-24 DIAGNOSIS — I63.89 CEREBROVASCULAR ACCIDENT (CVA) DUE TO OTHER MECHANISM (HCC): Primary | ICD-10-CM

## 2019-07-24 PROCEDURE — 99214 OFFICE O/P EST MOD 30 MIN: CPT | Performed by: INTERNAL MEDICINE

## 2019-07-24 RX ORDER — NICOTINE POLACRILEX 4 MG/1
GUM, CHEWING ORAL
Refills: 0 | COMMUNITY
Start: 2019-07-19

## 2019-07-24 RX ORDER — BENZONATATE 100 MG/1
CAPSULE ORAL
Refills: 0 | COMMUNITY
Start: 2019-07-19 | End: 2020-06-10 | Stop reason: SDUPTHER

## 2019-07-24 RX ORDER — LACTULOSE 10 G/15ML
SOLUTION ORAL 2 TIMES DAILY
Refills: 2 | COMMUNITY
Start: 2019-07-10 | End: 2021-09-09

## 2019-07-24 ASSESSMENT — ENCOUNTER SYMPTOMS
TROUBLE SWALLOWING: 0
DIARRHEA: 0
CHOKING: 0
VOICE CHANGE: 0
WHEEZING: 0
BLOOD IN STOOL: 0
CONSTIPATION: 0
CHEST TIGHTNESS: 0
ABDOMINAL PAIN: 0
STRIDOR: 0
SHORTNESS OF BREATH: 0
BACK PAIN: 0
COUGH: 0

## 2019-07-25 NOTE — PROGRESS NOTES
up with her GI as of yet but dropping off paper work        No results found for: LABA1C      ( goal A1C is < 7)   No results found for: LABMICR  No results found for: LDLCHOLESTEROL, LDLCALC    (goal LDL is <100)   No results found for: AST, ALT, BUN  BP Readings from Last 3 Encounters:   07/24/19 122/76   05/01/19 116/70   04/19/19 122/78          (goal 120/80)    Past Medical History:   Diagnosis Date    Anemia     Anxiety     Cirrhosis of liver (HCC)     Indigestion       Past Surgical History:   Procedure Laterality Date    LIVER SURGERY  2015       Family History   Problem Relation Age of Onset    Migraines Paternal Grandmother     High Cholesterol Mother     Cancer Father     Heart Disease Father        Social History     Tobacco Use    Smoking status: Never Smoker    Smokeless tobacco: Never Used   Substance Use Topics    Alcohol use: No     Alcohol/week: 0.0 standard drinks      Current Outpatient Medications   Medication Sig Dispense Refill    omeprazole 20 MG EC tablet TK 1 T PO QD  0    rifaximin (XIFAXAN) 550 MG tablet Take 550 mg by mouth      lactulose (CHRONULAC) 10 GM/15ML solution TK 15 ML PO QID  2    benzonatate (TESSALON) 100 MG capsule TK 1 C PO Q EIGHT H  PRN  0    gabapentin (NEURONTIN) 300 MG capsule Take 1 capsule by mouth 3 times daily for 30 days.  180 capsule 3    fluticasone (FLONASE) 50 MCG/ACT nasal spray 2 sprays by Nasal route daily 1 Bottle 0    ibuprofen (ADVIL;MOTRIN) 800 MG tablet Take 1 tablet by mouth every 8 hours as needed for Pain 60 tablet 1    amitriptyline (ELAVIL) 25 MG tablet Take 1 tablet by mouth nightly 90 tablet 3    spironolactone (ALDACTONE) 100 MG tablet Take 1 tablet by mouth daily  0    Biotin 10 MG CAPS Take 10 mg by mouth      esomeprazole (NEXIUM) 20 MG capsule Take 1 capsule by mouth daily 30 capsule 3    lactulose encephalopathy (GENERLAC) 10 GM/15ML SOLN solution Take 15 mLs by mouth 2 times daily 900 mL 0    multivitamin

## 2019-08-10 DIAGNOSIS — R30.0 DYSURIA: ICD-10-CM

## 2019-08-10 DIAGNOSIS — N39.0 URINARY TRACT INFECTION WITH HEMATURIA, SITE UNSPECIFIED: ICD-10-CM

## 2019-08-10 DIAGNOSIS — R31.9 URINARY TRACT INFECTION WITH HEMATURIA, SITE UNSPECIFIED: ICD-10-CM

## 2019-08-10 RX ORDER — PHENAZOPYRIDINE HYDROCHLORIDE 200 MG/1
200 TABLET, FILM COATED ORAL 3 TIMES DAILY PRN
Qty: 9 TABLET | Refills: 0 | Status: SHIPPED | OUTPATIENT
Start: 2019-08-10 | End: 2019-08-13

## 2019-08-10 RX ORDER — CIPROFLOXACIN 500 MG/1
500 TABLET, FILM COATED ORAL 2 TIMES DAILY
Qty: 10 TABLET | Refills: 0 | Status: SHIPPED | OUTPATIENT
Start: 2019-08-10 | End: 2019-08-15

## 2019-08-12 DIAGNOSIS — R51.9 NONINTRACTABLE HEADACHE, UNSPECIFIED CHRONICITY PATTERN, UNSPECIFIED HEADACHE TYPE: ICD-10-CM

## 2019-08-12 DIAGNOSIS — F51.01 PRIMARY INSOMNIA: ICD-10-CM

## 2019-08-12 RX ORDER — AMITRIPTYLINE HYDROCHLORIDE 25 MG/1
TABLET, FILM COATED ORAL
Qty: 90 TABLET | Refills: 0 | Status: SHIPPED | OUTPATIENT
Start: 2019-08-12 | End: 2019-09-08 | Stop reason: SDUPTHER

## 2019-09-08 DIAGNOSIS — R51.9 NONINTRACTABLE HEADACHE, UNSPECIFIED CHRONICITY PATTERN, UNSPECIFIED HEADACHE TYPE: ICD-10-CM

## 2019-09-08 DIAGNOSIS — F51.01 PRIMARY INSOMNIA: ICD-10-CM

## 2019-09-08 RX ORDER — AMITRIPTYLINE HYDROCHLORIDE 25 MG/1
TABLET, FILM COATED ORAL
Qty: 90 TABLET | Refills: 0 | Status: SHIPPED | OUTPATIENT
Start: 2019-09-08 | End: 2019-12-11 | Stop reason: SDUPTHER

## 2019-09-18 ENCOUNTER — TELEPHONE (OUTPATIENT)
Dept: FAMILY MEDICINE CLINIC | Age: 54
End: 2019-09-18

## 2019-09-18 DIAGNOSIS — I63.89 CEREBROVASCULAR ACCIDENT (CVA) DUE TO OTHER MECHANISM (HCC): Primary | ICD-10-CM

## 2019-09-18 DIAGNOSIS — S92.909D CLOSED FRACTURE OF FOOT WITH ROUTINE HEALING, UNSPECIFIED LATERALITY, SUBSEQUENT ENCOUNTER: ICD-10-CM

## 2019-10-02 DIAGNOSIS — M25.561 ACUTE PAIN OF RIGHT KNEE: ICD-10-CM

## 2019-10-02 DIAGNOSIS — S92.909D CLOSED FRACTURE OF FOOT WITH ROUTINE HEALING, UNSPECIFIED LATERALITY, SUBSEQUENT ENCOUNTER: ICD-10-CM

## 2019-10-02 DIAGNOSIS — R53.1 WEAKNESS: ICD-10-CM

## 2019-10-02 DIAGNOSIS — I63.89 CEREBROVASCULAR ACCIDENT (CVA) DUE TO OTHER MECHANISM (HCC): ICD-10-CM

## 2019-10-03 ENCOUNTER — TELEPHONE (OUTPATIENT)
Dept: FAMILY MEDICINE CLINIC | Age: 54
End: 2019-10-03

## 2019-10-03 RX ORDER — GABAPENTIN 300 MG/1
300 CAPSULE ORAL 3 TIMES DAILY
Qty: 90 CAPSULE | Refills: 0 | Status: SHIPPED | OUTPATIENT
Start: 2019-10-03 | End: 2021-09-09

## 2019-10-03 RX ORDER — BACLOFEN 20 MG/1
20 TABLET ORAL 3 TIMES DAILY
Qty: 30 TABLET | Refills: 3 | Status: SHIPPED | OUTPATIENT
Start: 2019-10-03 | End: 2021-09-09

## 2019-11-08 RX ORDER — FLUTICASONE PROPIONATE 50 MCG
SPRAY, SUSPENSION (ML) NASAL
Qty: 1 BOTTLE | Refills: 5 | Status: SHIPPED | OUTPATIENT
Start: 2019-11-08 | End: 2021-09-09

## 2019-11-11 ENCOUNTER — TELEPHONE (OUTPATIENT)
Dept: FAMILY MEDICINE CLINIC | Age: 54
End: 2019-11-11

## 2019-11-11 DIAGNOSIS — R53.1 WEAKNESS: ICD-10-CM

## 2019-11-11 DIAGNOSIS — I63.89 CEREBROVASCULAR ACCIDENT (CVA) DUE TO OTHER MECHANISM (HCC): Primary | ICD-10-CM

## 2019-12-04 ENCOUNTER — TELEPHONE (OUTPATIENT)
Dept: FAMILY MEDICINE CLINIC | Age: 54
End: 2019-12-04

## 2019-12-06 ENCOUNTER — CARE COORDINATION (OUTPATIENT)
Dept: CARE COORDINATION | Age: 54
End: 2019-12-06

## 2019-12-09 ENCOUNTER — CARE COORDINATION (OUTPATIENT)
Dept: CARE COORDINATION | Age: 54
End: 2019-12-09

## 2019-12-09 ENCOUNTER — TELEPHONE (OUTPATIENT)
Dept: FAMILY MEDICINE CLINIC | Age: 54
End: 2019-12-09

## 2019-12-11 DIAGNOSIS — R51.9 NONINTRACTABLE HEADACHE, UNSPECIFIED CHRONICITY PATTERN, UNSPECIFIED HEADACHE TYPE: ICD-10-CM

## 2019-12-11 DIAGNOSIS — F51.01 PRIMARY INSOMNIA: ICD-10-CM

## 2019-12-11 RX ORDER — AMITRIPTYLINE HYDROCHLORIDE 25 MG/1
25 TABLET, FILM COATED ORAL NIGHTLY
Qty: 90 TABLET | Refills: 1 | Status: SHIPPED | OUTPATIENT
Start: 2019-12-11 | End: 2020-02-05

## 2020-02-05 RX ORDER — AMITRIPTYLINE HYDROCHLORIDE 25 MG/1
TABLET, FILM COATED ORAL
Qty: 90 TABLET | Refills: 1 | Status: SHIPPED | OUTPATIENT
Start: 2020-02-05 | End: 2020-06-24 | Stop reason: SDUPTHER

## 2020-02-24 ENCOUNTER — TELEPHONE (OUTPATIENT)
Dept: FAMILY MEDICINE CLINIC | Age: 55
End: 2020-02-24

## 2020-02-24 NOTE — TELEPHONE ENCOUNTER
Pt needs a referral for physical therapy and occupational therapy for rt arm, rt wrist and rt hand at [address] [place] [address] faxed over to [phone] asap because she is on her way over there now. Any questions call her cell at [phone].

## 2020-06-10 RX ORDER — BENZONATATE 100 MG/1
100 CAPSULE ORAL 3 TIMES DAILY PRN
Qty: 21 CAPSULE | Refills: 0 | Status: SHIPPED | OUTPATIENT
Start: 2020-06-10 | End: 2020-07-22

## 2020-06-24 RX ORDER — AMITRIPTYLINE HYDROCHLORIDE 25 MG/1
TABLET, FILM COATED ORAL
Qty: 90 TABLET | Refills: 0 | Status: SHIPPED | OUTPATIENT
Start: 2020-06-24 | End: 2020-11-30 | Stop reason: SDUPTHER

## 2020-07-10 ENCOUNTER — TELEPHONE (OUTPATIENT)
Dept: FAMILY MEDICINE CLINIC | Age: 55
End: 2020-07-10

## 2020-07-10 NOTE — TELEPHONE ENCOUNTER
Patient calling she need order for PT and OT send to [place]   She states it was due to her stroke   Fax# [phone]

## 2020-07-22 RX ORDER — BENZONATATE 100 MG/1
CAPSULE ORAL
Qty: 21 CAPSULE | Refills: 0 | Status: SHIPPED | OUTPATIENT
Start: 2020-07-22 | End: 2021-01-20 | Stop reason: SDUPTHER

## 2020-09-24 ENCOUNTER — TELEPHONE (OUTPATIENT)
Dept: FAMILY MEDICINE CLINIC | Age: 55
End: 2020-09-24

## 2020-11-05 ENCOUNTER — TELEPHONE (OUTPATIENT)
Dept: FAMILY MEDICINE CLINIC | Age: 55
End: 2020-11-05

## 2020-11-30 RX ORDER — AMITRIPTYLINE HYDROCHLORIDE 25 MG/1
TABLET, FILM COATED ORAL
Qty: 90 TABLET | Refills: 0 | Status: SHIPPED | OUTPATIENT
Start: 2020-11-30 | End: 2021-03-07

## 2021-01-20 ENCOUNTER — VIRTUAL VISIT (OUTPATIENT)
Dept: FAMILY MEDICINE CLINIC | Age: 56
End: 2021-01-20
Payer: COMMERCIAL

## 2021-01-20 DIAGNOSIS — L03.115 CELLULITIS OF RIGHT LOWER EXTREMITY: Primary | ICD-10-CM

## 2021-01-20 PROCEDURE — 99213 OFFICE O/P EST LOW 20 MIN: CPT | Performed by: INTERNAL MEDICINE

## 2021-01-20 RX ORDER — DOXYCYCLINE HYCLATE 100 MG
100 TABLET ORAL DAILY
Qty: 14 TABLET | Refills: 0 | Status: SHIPPED | OUTPATIENT
Start: 2021-01-20 | End: 2021-02-13 | Stop reason: SDUPTHER

## 2021-01-20 RX ORDER — BENZONATATE 100 MG/1
CAPSULE ORAL
Qty: 21 CAPSULE | Refills: 5 | Status: SHIPPED | OUTPATIENT
Start: 2021-01-20 | End: 2021-09-09 | Stop reason: SDUPTHER

## 2021-01-20 RX ORDER — CEPHALEXIN 500 MG/1
500 CAPSULE ORAL 2 TIMES DAILY
COMMUNITY
Start: 2021-01-14 | End: 2021-01-24

## 2021-01-20 RX ORDER — TRAMADOL HYDROCHLORIDE 50 MG/1
50 TABLET ORAL EVERY 8 HOURS PRN
COMMUNITY
Start: 2021-01-14 | End: 2021-09-09

## 2021-01-20 ASSESSMENT — ENCOUNTER SYMPTOMS
BACK PAIN: 0
DIARRHEA: 0
CONSTIPATION: 0
ABDOMINAL PAIN: 0
COLOR CHANGE: 1

## 2021-01-20 ASSESSMENT — PATIENT HEALTH QUESTIONNAIRE - PHQ9
2. FEELING DOWN, DEPRESSED OR HOPELESS: 0
SUM OF ALL RESPONSES TO PHQ QUESTIONS 1-9: 0
SUM OF ALL RESPONSES TO PHQ9 QUESTIONS 1 & 2: 0
1. LITTLE INTEREST OR PLEASURE IN DOING THINGS: 0

## 2021-01-20 NOTE — PROGRESS NOTES
2021    TELEHEALTH EVALUATION -- Audio/Visual (During ENU-88 public health emergency)    HPI:    Radha Blair (:  1965) has requested an audio/video evaluation for the following concern(s):    Rash: aRdha Blair is a 54 y.o. female who presents with a 2 week history of a generalized rash. Rash first presented on the right foot and has not spread. Rash is red and is macular. Associated symptoms include fever. Patient has tried topical antibiotic - bactroban , systemic antibiotic - keflex bid for one week . New exposures: none. Patient has not had contacts with similar symptoms. Prior history of similar symptoms: no.  Tripped over her dog bowl last week then foot got swollen and red   Went to ED  They did xray which was neg   Has had issues with this foot before chronically but not usually red   Not much improvement   Has been doing ice   Slight pain     Review of Systems   Constitutional: Positive for activity change. Negative for appetite change, chills, diaphoresis, fatigue, fever and unexpected weight change. Eyes: Negative for visual disturbance. Cardiovascular: Negative for chest pain. Gastrointestinal: Negative for abdominal pain, constipation and diarrhea. Musculoskeletal: Positive for arthralgias, gait problem and joint swelling. Negative for back pain, myalgias, neck pain and neck stiffness. Skin: Positive for color change, rash and wound. Negative for pallor. Allergic/Immunologic: Positive for immunocompromised state. Neurological: Negative for weakness, light-headedness and headaches. Hematological: Negative for adenopathy. Does not bruise/bleed easily. Psychiatric/Behavioral: Negative for sleep disturbance. Prior to Visit Medications    Medication Sig Taking?  Authorizing Provider   cephALEXin (KEFLEX) 500 MG capsule Take 500 mg by mouth 2 times daily Yes Historical Provider, MD traMADol (ULTRAM) 50 MG tablet Take 50 mg by mouth every 8 hours as needed. Yes Historical Provider, MD   doxycycline hyclate (VIBRA-TABS) 100 MG tablet Take 1 tablet by mouth daily for 14 days Yes Lexus Faster, DO   benzonatate (TESSALON) 100 MG capsule TAKE 1 CAPSULE BY MOUTH THREE TIMES DAILY AS NEEDED FOR COUGH Yes Lexus Faster, DO   amitriptyline (ELAVIL) 25 MG tablet TAKE 1 TABLET BY MOUTH EVERY EVENING Yes Lexus Faster, DO   fluticasone (FLONASE) 50 MCG/ACT nasal spray INSTILL 2 SPRAYS INTO EACH NOSTRIL EVERY DAY Yes Lexus Faster, DO   gabapentin (NEURONTIN) 300 MG capsule Take 1 capsule by mouth 3 times daily for 30 days. Yes Lexus Faster, DO   baclofen (LIORESAL) 20 MG tablet Take 1 tablet by mouth 3 times daily Yes Margret Bustos,    omeprazole 20 MG EC tablet TK 1 T PO QD Yes Historical Provider, MD   rifaximin (XIFAXAN) 550 MG tablet Take 550 mg by mouth Yes Historical Provider, MD   lactulose (CHRONULAC) 10 GM/15ML solution TK 15 ML PO QID Yes Historical Provider, MD   spironolactone (ALDACTONE) 100 MG tablet Take 1 tablet by mouth daily Yes Historical Provider, MD   Biotin 10 MG CAPS Take 10 mg by mouth Yes Historical Provider, MD   esomeprazole (NEXIUM) 20 MG capsule Take 1 capsule by mouth daily Yes Octavio Sosa MD   lactulose encephalopathy (GENERLAC) 10 GM/15ML SOLN solution Take 15 mLs by mouth 2 times daily Yes Octavio Sosa MD   multivitamin (OCUVITE) TABS per tablet Take 1 tablet by mouth daily Yes Octavio Sosa MD   Misc. Devices MISC 1 each by Does not apply route once for 1 dose OA reaction brace for the right knee. Wear brace as directed by physician. Apply to right knee  Inell Smolder, DO       Social History     Tobacco Use    Smoking status: Never Smoker    Smokeless tobacco: Never Used   Substance Use Topics    Alcohol use: No     Alcohol/week: 0.0 standard drinks    Drug use:  No            PHYSICAL EXAMINATION: [ INSTRUCTIONS:  \"[x]\" Indicates a positive item  \"[]\" Indicates a negative item  -- DELETE ALL ITEMS NOT EXAMINED]  Vital Signs: (As obtained by patient/caregiver or practitioner observation)    Blood pressure-  Heart rate-    Respiratory rate-    Temperature-  Pulse oximetry-     Constitutional: [] Appears well-developed and well-nourished [] No apparent distress      [] Abnormal-   Mental status  [] Alert and awake  [] Oriented to person/place/time []Able to follow commands      Eyes:  EOM    []  Normal  [] Abnormal-  Sclera  []  Normal  [] Abnormal -         Discharge []  None visible  [] Abnormal -    HENT:   [] Normocephalic, atraumatic. [] Abnormal   [] Mouth/Throat: Mucous membranes are moist.     External Ears [] Normal  [] Abnormal-     Neck: [] No visualized mass     Pulmonary/Chest: [] Respiratory effort normal.  [] No visualized signs of difficulty breathing or respiratory distress        [] Abnormal-      Musculoskeletal:   [] Normal gait with no signs of ataxia         [] Normal range of motion of neck        [] Abnormal-       Neurological:        [] No Facial Asymmetry (Cranial nerve 7 motor function) (limited exam to video visit)          [] No gaze palsy        [] Abnormal-         Skin:        [] No significant exanthematous lesions or discoloration noted on facial skin         [] Abnormal-            Psychiatric:       [] Normal Affect [] No Hallucinations        [] Abnormal-     Other pertinent observable physical exam findings-     ASSESSMENT/PLAN:  There are no diagnoses linked to this encounter. No follow-ups on file. Yvon Gomez is a 54 y.o. female being evaluated by a Virtual Visit (video visit) encounter to address concerns as mentioned above. A caregiver was present when appropriate. Due to this being a TeleHealth encounter (During [facility] public health emergency), evaluation of the following organ systems was limited: Vitals/Constitutional/EENT/Resp/CV/GI//MS/Neuro/Skin/Heme-Lymph-Imm. Pursuant to the emergency declaration under the [address] [place] [address] and the [employer] Act, this Virtual Visit was conducted with patient's (and/or legal guardian's) consent, to reduce the patient's risk of exposure to COVID-19 and provide necessary medical care. The patient (and/or legal guardian) has also been advised to contact this office for worsening conditions or problems, and seek emergency medical treatment and/or call 911 if deemed necessary. Patient identification was verified at the start of the visit: Yes    Total time spent on this encounter: 22 minutes    Services were provided through a video synchronous discussion virtually to substitute for in-person clinic visit. Patient and provider were located at their individual homes. --Julienne Mackey DO on 1/20/2021 at 4:13 PM    An electronic signature was used to authenticate this note.

## 2021-02-12 ENCOUNTER — TELEPHONE (OUTPATIENT)
Dept: FAMILY MEDICINE CLINIC | Age: 56
End: 2021-02-12

## 2021-02-12 NOTE — TELEPHONE ENCOUNTER
Patient called stating that she needs [facility] to fill the script for the infection on her foot.      Please advise

## 2021-02-13 RX ORDER — DOXYCYCLINE HYCLATE 100 MG
100 TABLET ORAL DAILY
Qty: 14 TABLET | Refills: 0 | Status: SHIPPED | OUTPATIENT
Start: 2021-02-13 | End: 2021-02-23 | Stop reason: SINTOL

## 2021-02-23 ENCOUNTER — OFFICE VISIT (OUTPATIENT)
Dept: PRIMARY CARE CLINIC | Age: 56
End: 2021-02-23
Payer: COMMERCIAL

## 2021-02-23 VITALS
SYSTOLIC BLOOD PRESSURE: 118 MMHG | DIASTOLIC BLOOD PRESSURE: 63 MMHG | WEIGHT: 164 LBS | HEART RATE: 82 BPM | BODY MASS INDEX: 28 KG/M2 | HEIGHT: 64 IN | OXYGEN SATURATION: 97 %

## 2021-02-23 DIAGNOSIS — R21 RASH: ICD-10-CM

## 2021-02-23 DIAGNOSIS — T78.40XA ALLERGIC REACTION, INITIAL ENCOUNTER: Primary | ICD-10-CM

## 2021-02-23 PROCEDURE — 99213 OFFICE O/P EST LOW 20 MIN: CPT | Performed by: PHYSICIAN ASSISTANT

## 2021-02-23 RX ORDER — TRIAMCINOLONE ACETONIDE 40 MG/ML
40 INJECTION, SUSPENSION INTRA-ARTICULAR; INTRAMUSCULAR ONCE
Status: CANCELLED | OUTPATIENT
Start: 2021-02-23 | End: 2021-02-23

## 2021-02-23 RX ORDER — METHYLPREDNISOLONE 4 MG/1
TABLET ORAL
Qty: 1 KIT | Refills: 0 | Status: SHIPPED | OUTPATIENT
Start: 2021-02-23 | End: 2021-03-13

## 2021-02-23 RX ORDER — TRIAMCINOLONE ACETONIDE 1 MG/G
CREAM TOPICAL
Qty: 45 G | Refills: 0 | Status: SHIPPED | OUTPATIENT
Start: 2021-02-23 | End: 2021-09-09

## 2021-02-23 ASSESSMENT — ENCOUNTER SYMPTOMS
DIARRHEA: 0
SORE THROAT: 0
COUGH: 0
EYE PAIN: 0
VOMITING: 0
NAIL CHANGES: 0
RHINORRHEA: 0

## 2021-02-23 NOTE — PROGRESS NOTES
CURRENT MEDICATIONS       Current Outpatient Medications   Medication Sig Dispense Refill    triamcinolone (KENALOG) 0.1 % cream Apply topically 2 times daily. 45 g 0    methylPREDNISolone (MEDROL DOSEPACK) 4 MG tablet Take as medrol dose pack. Take by mouth. 1 kit 0    doxycycline hyclate (VIBRA-TABS) 100 MG tablet Take 1 tablet by mouth daily for 14 days 14 tablet 0    traMADol (ULTRAM) 50 MG tablet Take 50 mg by mouth every 8 hours as needed.  benzonatate (TESSALON) 100 MG capsule TAKE 1 CAPSULE BY MOUTH THREE TIMES DAILY AS NEEDED FOR COUGH 21 capsule 5    amitriptyline (ELAVIL) 25 MG tablet TAKE 1 TABLET BY MOUTH EVERY EVENING 90 tablet 0    fluticasone (FLONASE) 50 MCG/ACT nasal spray INSTILL 2 SPRAYS INTO EACH NOSTRIL EVERY DAY 1 Bottle 5    gabapentin (NEURONTIN) 300 MG capsule Take 1 capsule by mouth 3 times daily for 30 days. 90 capsule 0    baclofen (LIORESAL) 20 MG tablet Take 1 tablet by mouth 3 times daily 30 tablet 3    omeprazole 20 MG EC tablet TK 1 T PO QD  0    rifaximin (XIFAXAN) 550 MG tablet Take 550 mg by mouth      lactulose (CHRONULAC) 10 GM/15ML solution TK 15 ML PO QID  2    Misc. Devices MISC 1 each by Does not apply route once for 1 dose OA reaction brace for the right knee. Wear brace as directed by physician. Apply to right knee 1 Device 0    spironolactone (ALDACTONE) 100 MG tablet Take 1 tablet by mouth daily  0    Biotin 10 MG CAPS Take 10 mg by mouth      esomeprazole (NEXIUM) 20 MG capsule Take 1 capsule by mouth daily 30 capsule 3    lactulose encephalopathy (GENERLAC) 10 GM/15ML SOLN solution Take 15 mLs by mouth 2 times daily 900 mL 0    multivitamin (OCUVITE) TABS per tablet Take 1 tablet by mouth daily 30 tablet 3     No current facility-administered medications for this visit.           ALLERGIES     Latex, Aleve [naproxen], Other, and Sulfa antibiotics    FAMILY HISTORY           Problem Relation Age of Onset  Migraines Paternal Grandmother     High Cholesterol Mother     Cancer Father     Heart Disease Father      Family Status   Relation Name Status    PGM  (Not Specified)    Mother  Alive    Father            SOCIAL HISTORY      reports that she has never smoked. She has never used smokeless tobacco. She reports that she does not drink alcohol or use drugs. PHYSICAL EXAM    (up to 7 for level 4, 8 or more for level 5)     Vitals:    21 1305   BP: 118/63   Site: Left Upper Arm   Position: Sitting   Cuff Size: Large Adult   Pulse: 82   SpO2: 97%   Weight: 164 lb (74.4 kg)   Height: 5' 4\" (1.626 m)         Physical Exam  Vitals signs and nursing note reviewed. Constitutional:       Appearance: Normal appearance. HENT:      Head: Normocephalic and atraumatic. Right Ear: External ear normal.      Left Ear: External ear normal.      Nose: Nose normal.      Mouth/Throat:      Mouth: Mucous membranes are moist.   Eyes:      Extraocular Movements: Extraocular movements intact. Conjunctiva/sclera: Conjunctivae normal.      Pupils: Pupils are equal, round, and reactive to light. Cardiovascular:      Rate and Rhythm: Normal rate and regular rhythm. Pulmonary:      Effort: Pulmonary effort is normal.   Abdominal:      Palpations: Abdomen is soft. Skin:     Findings: Rash present. Neurological:      Mental Status: She is alert and oriented to person, place, and time. DIFFERENTIAL DIAGNOSIS:         Mao Alcala reviewed the disposition diagnosis with the patient and or their family/guardian. I have answered their questions and given discharge instructions. They voiced understanding of these instructions and did not have anyfurther questions or complaints. PROCEDURES:  No orders of the defined types were placed in this encounter. No results found for this visit on 21.     FINALIMPRESSION      Visit Diagnoses and Associated Orders

## 2021-03-07 DIAGNOSIS — R51.9 NONINTRACTABLE HEADACHE, UNSPECIFIED CHRONICITY PATTERN, UNSPECIFIED HEADACHE TYPE: ICD-10-CM

## 2021-03-07 DIAGNOSIS — F51.01 PRIMARY INSOMNIA: ICD-10-CM

## 2021-03-07 RX ORDER — AMITRIPTYLINE HYDROCHLORIDE 25 MG/1
TABLET, FILM COATED ORAL
Qty: 90 TABLET | Refills: 0 | Status: SHIPPED | OUTPATIENT
Start: 2021-03-07 | Stop reason: SDUPTHER

## 2021-03-12 ENCOUNTER — TELEPHONE (OUTPATIENT)
Dept: PRIMARY CARE CLINIC | Age: 56
End: 2021-03-12

## 2021-03-12 NOTE — TELEPHONE ENCOUNTER
Patient is calling stating that the rash she was being treated for on 2/23/2021 has came back and is asking for a refill on the methylPREDNISolone?

## 2021-03-13 RX ORDER — METHYLPREDNISOLONE 4 MG/1
TABLET ORAL
Qty: 1 KIT | Refills: 0 | Status: SHIPPED | OUTPATIENT
Start: 2021-03-13 | End: 2021-09-09

## 2021-05-04 DIAGNOSIS — R51.9 NONINTRACTABLE HEADACHE, UNSPECIFIED CHRONICITY PATTERN, UNSPECIFIED HEADACHE TYPE: ICD-10-CM

## 2021-05-04 DIAGNOSIS — F51.01 PRIMARY INSOMNIA: ICD-10-CM

## 2021-05-04 RX ORDER — AMITRIPTYLINE HYDROCHLORIDE 25 MG/1
TABLET, FILM COATED ORAL
Qty: 90 TABLET | Refills: 0 | Status: SHIPPED | OUTPATIENT
Start: 2021-05-04 | End: 2021-09-09 | Stop reason: SDUPTHER

## 2021-09-09 ENCOUNTER — OFFICE VISIT (OUTPATIENT)
Dept: FAMILY MEDICINE CLINIC | Age: 56
End: 2021-09-09
Payer: COMMERCIAL

## 2021-09-09 VITALS
DIASTOLIC BLOOD PRESSURE: 72 MMHG | WEIGHT: 173 LBS | BODY MASS INDEX: 29.53 KG/M2 | HEART RATE: 105 BPM | HEIGHT: 64 IN | RESPIRATION RATE: 18 BRPM | SYSTOLIC BLOOD PRESSURE: 130 MMHG | OXYGEN SATURATION: 97 %

## 2021-09-09 DIAGNOSIS — Z13.29 THYROID DISORDER SCREEN: ICD-10-CM

## 2021-09-09 DIAGNOSIS — R51.9 NONINTRACTABLE HEADACHE, UNSPECIFIED CHRONICITY PATTERN, UNSPECIFIED HEADACHE TYPE: ICD-10-CM

## 2021-09-09 DIAGNOSIS — K72.10 CHRONIC LIVER FAILURE WITHOUT HEPATIC COMA (HCC): ICD-10-CM

## 2021-09-09 DIAGNOSIS — F41.9 ANXIETY: Primary | ICD-10-CM

## 2021-09-09 DIAGNOSIS — F51.01 PRIMARY INSOMNIA: ICD-10-CM

## 2021-09-09 DIAGNOSIS — R05.9 COUGH: ICD-10-CM

## 2021-09-09 DIAGNOSIS — Z13.220 LIPID SCREENING: ICD-10-CM

## 2021-09-09 DIAGNOSIS — I63.9 CEREBROVASCULAR ACCIDENT (CVA), UNSPECIFIED MECHANISM (HCC): ICD-10-CM

## 2021-09-09 PROCEDURE — 99213 OFFICE O/P EST LOW 20 MIN: CPT | Performed by: NURSE PRACTITIONER

## 2021-09-09 RX ORDER — BENZONATATE 100 MG/1
CAPSULE ORAL
Qty: 21 CAPSULE | Refills: 1 | Status: SHIPPED | OUTPATIENT
Start: 2021-09-09 | End: 2021-11-18 | Stop reason: SDUPTHER

## 2021-09-09 RX ORDER — AMITRIPTYLINE HYDROCHLORIDE 25 MG/1
TABLET, FILM COATED ORAL
Qty: 90 TABLET | Refills: 1 | Status: SHIPPED | OUTPATIENT
Start: 2021-09-09 | End: 2022-05-09

## 2021-09-09 ASSESSMENT — ENCOUNTER SYMPTOMS
VOMITING: 0
ABDOMINAL PAIN: 0
SINUS PAIN: 0
NAUSEA: 0
COUGH: 1
SORE THROAT: 0
SHORTNESS OF BREATH: 0
DIARRHEA: 0

## 2021-09-09 NOTE — PATIENT INSTRUCTIONS
Patient Education        Cirrhosis: Care Instructions  Your Care Instructions     Cirrhosis occurs when healthy tissue in your liver gets scarred. This keeps the liver from working well. It usually happens after a liver has been inflamed for years. Cirrhosis is most often caused by alcohol use disorder or hepatitis infection. But there are other causes too. These include medicines and too much fat in the liver. Conditions passed down in families and other disorders can also cause it. In some cases, no cause can be found. Treatment can't completely fix liver damage. But you may be able to slow or prevent more damage if you don't drink alcohol or use drugs that harm your liver. Follow-up care is a key part of your treatment and safety. Be sure to make and go to all appointments, and call your doctor if you are having problems. It's also a good idea to know your test results and keep a list of the medicines you take. How can you care for yourself at home? · Do not drink any alcohol. It can harm your liver. Talk to your doctor if you need help to stop drinking. · Be safe with medicines. Take your medicines exactly as prescribed. Call your doctor if you think you are having a problem with your medicine. · Talk to your doctor before you take any other medicines. These include over-the-counter medicines and herbal products. · Be careful taking acetaminophen (Tylenol), ibuprofen (Advil, Motrin), or naproxen (Aleve). These can sometimes cause more liver damage. Talk with your doctor if you're not sure which medicines are safe. · If your cirrhosis causes extra fluid to build up in your body, try not to eat a lot of salt. Use less salt when you cook and at the table. Don't eat fast foods or snack foods with a lot of salt. Extra fluid in your belly, legs, and chest can cause serious problems. · Work with your doctor or a dietitian to be sure you eat the right amount of carbohydrate, protein, fat, and sodium (salt). It's very important to choose the best foods for the health of your liver. · If your doctor recommends it, limit how much fluid you drink. · If your doctor recommends it, get more exercise. Walking is a good choice. Bit by bit, increase the amount you walk every day. Try for at least 30 minutes on most days of the week. You also may want to swim, bike, or do other activities. When should you call for help? Call 911 anytime you think you may need emergency care. For example, call if:    · You have trouble breathing.     · You vomit blood or what looks like coffee grounds. Call your doctor now or seek immediate medical care if:    · You feel very sleepy or confused.     · You have new belly pain, or your pain gets worse.     · You have a fever.     · There is a new or increasing yellow tint to your skin or the whites of your eyes.     · You have any abnormal bleeding, such as:  ? Nosebleeds. ? Vaginal bleeding that is different (heavier, more frequent, at a different time of the month) than what you are used to.  ? Bloody or black stools, or rectal bleeding. ? Bloody or pink urine. Watch closely for changes in your health, and be sure to contact your doctor if:    · You have any problems.     · Your belly is getting bigger.     · You are gaining weight. Where can you learn more? Go to [email or web address] and sign in to your [employer] account. Enter M412 in the [facility] box to learn more about \"Cirrhosis: Care Instructions. \"     If you do not have an account, please click on the \"Sign Up Now\" link. Current as of: February 10, 2021               Content Version: 12.9  © 5981-5967 [employer]wise, Incorporated. Care instructions adapted under license by [facility] ([facility]). If you have questions about a medical condition or this instruction, always ask your healthcare professional. [address] any warranty or liability for your use of this information.

## 2021-09-09 NOTE — LETTER
[address] [place] [id number]  Phone: [phone]  Fax: [phone]    LUCY Rome CNP        September 9, 2021     Patient: Luis Alberto Nuñez   YOB: 1965   Date of Visit: 9/9/2021       To Whom It May Concern: It is my medical opinion that Colten Farah is considered average risk for serious surgical complication based on NSQIP surgical risk calculation. If you have any questions or concerns, please don't hesitate to call.     Sincerely,        LUCY Rome CNP

## 2021-09-09 NOTE — PROGRESS NOTES
Antibiotics     Doxycycline Rash       Subjective:      Review of Systems   Constitutional: Negative for chills and fever. HENT: Negative for ear pain, sinus pain and sore throat. Respiratory: Positive for cough. Negative for shortness of breath. Cardiovascular: Negative for chest pain and palpitations. Gastrointestinal: Negative for abdominal pain, diarrhea, nausea and vomiting. Neurological: Negative for dizziness and headaches. Psychiatric/Behavioral: The patient is nervous/anxious. All other systems reviewed and are negative.      :Objective     Physical Exam  Vitals and nursing note reviewed. Constitutional:       General: She is not in acute distress. Appearance: Normal appearance. She is not toxic-appearing. Cardiovascular:      Rate and Rhythm: Normal rate. Pulmonary:      Effort: Pulmonary effort is normal.      Breath sounds: Normal breath sounds. Skin:     General: Skin is warm and dry. Neurological:      General: No focal deficit present. Mental Status: She is alert and oriented to person, place, and time. Mental status is at baseline. Comments: Rue deficit       /72   Pulse 105   Resp 18   Ht 5' 4\" (1.626 m)   Wt 173 lb (78.5 kg)   SpO2 97%   BMI 29.70 kg/m²     Lab Review   No visits with results within 6 Month(s) from this visit. Latest known visit with results is:   Hospital Outpatient Visit on 02/16/2019   Component Date Value    Specimen Description 02/16/2019 . CLEAN CATCH URINE     Special Requests 02/16/2019 NOT REPORTED     Culture 02/16/2019 *                    Value:ESCHERICHIA COLI  >[id number] CFU/ML         Assessment and Plan      1. Anxiety  -     CBC With Auto Differential; Future  2. Cerebrovascular accident (CVA), unspecified mechanism ([address]  -     CBC With Auto Differential; Future  3. Chronic liver failure without hepatic coma (HCC)  -     CBC With Auto Differential; Future  -     Comprehensive Metabolic Panel; Future  4.  Lipid screening  -     CBC With Auto Differential; Future  -     Lipid Panel; Future  5. Thyroid disorder screen  -     CBC With Auto Differential; Future  -     TSH With Reflex Ft4; Future  6. Primary insomnia  -     amitriptyline (ELAVIL) 25 MG tablet; TAKE 1 TABLET BY MOUTH EVERY EVENING, Disp-90 tablet, R-1Normal  -     CBC With Auto Differential; Future  7. Nonintractable headache, unspecified chronicity pattern, unspecified headache type  -     amitriptyline (ELAVIL) 25 MG tablet; TAKE 1 TABLET BY MOUTH EVERY EVENING, Disp-90 tablet, R-1Normal  -     CBC With Auto Differential; Future  8. Cough  -     benzonatate (TESSALON) 100 MG capsule; TAKE 1 CAPSULE BY MOUTH THREE TIMES DAILY AS NEEDED FOR COUGH, Disp-21 capsule, R-1Normal  -     CBC With Auto Differential; Future     Plan for labs to include CBC, CMP, lipids, TSH    Refill provided on patient's Elavil    Refill provided on patient's Tessalon for use as needed    Surgical letter provided    f/u in 6 months,sooner prn        No results found for this visit on 09/09/21. Return if symptoms worsen or fail to improve. Orders Placed This Encounter   Medications    amitriptyline (ELAVIL) 25 MG tablet     Sig: TAKE 1 TABLET BY MOUTH EVERY EVENING     Dispense:  90 tablet     Refill:  1    benzonatate (TESSALON) 100 MG capsule     Sig: TAKE 1 CAPSULE BY MOUTH THREE TIMES DAILY AS NEEDED FOR COUGH     Dispense:  21 capsule     Refill:  1        Patient given educational materials - see patient instructions. Discussed use, benefit, and side effects of prescribed medications. All patientquestions answered. Pt voiced understanding. Patient given educational materials - see patient instructions. Discussed use, benefit, and side effects of prescribed medications. All patientquestions answered. Pt voiced understanding. This note was transcribed using dictation with Dragon services.  Efforts were made to correct any errors but some words may be misinterpreted.     Electronically signed by LUCY Weiss CNP on 9/9/2021at 4:01 PM

## 2021-09-16 DIAGNOSIS — R18.8 CIRRHOSIS OF LIVER WITH ASCITES (HCC): ICD-10-CM

## 2021-09-16 DIAGNOSIS — K74.60 CIRRHOSIS OF LIVER WITH ASCITES (HCC): ICD-10-CM

## 2021-09-16 DIAGNOSIS — K76.82 ENCEPHALOPATHY, HEPATIC: ICD-10-CM

## 2021-09-16 RX ORDER — LACTULOSE 10 G/15ML
10 SOLUTION ORAL; RECTAL 2 TIMES DAILY
Qty: 900 ML | Refills: 0 | Status: SHIPPED | OUTPATIENT
Start: 2021-09-16

## 2021-10-06 ENCOUNTER — TELEPHONE (OUTPATIENT)
Dept: FAMILY MEDICINE CLINIC | Age: 56
End: 2021-10-06

## 2021-10-06 DIAGNOSIS — Z12.31 ENCOUNTER FOR SCREENING MAMMOGRAM FOR MALIGNANT NEOPLASM OF BREAST: Primary | ICD-10-CM

## 2021-10-06 NOTE — TELEPHONE ENCOUNTER
Radiology called stating that the patient needs order for mammogram placed, shes trying to schedule one. Once placed, it needs faxed to [phone]. Please advise.

## 2021-10-21 ENCOUNTER — TELEPHONE (OUTPATIENT)
Dept: FAMILY MEDICINE CLINIC | Age: 56
End: 2021-10-21
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
## 2021-10-21 NOTE — TELEPHONE ENCOUNTER
I called patient and she states she is moving out soon and is going to be in a safer place. She will call us when she is settled. She advised for us to take Stefania Proctor off of emergency contact and communication release form. I did. She will update a new one at next appt. She has two emergency contacts now. 1- Gorgeaugusto Lee (friend) #[phone]  2- Teresa Scottd) #[phone]    She states if we can not get in contact with her regarding any of her information to call Ramiro Thacker. Son is overseas but can be reached if needed.    She does not want us to give any information to [employer])

## 2021-11-02 ENCOUNTER — TELEPHONE (OUTPATIENT)
Dept: FAMILY MEDICINE CLINIC | Age: 56
End: 2021-11-02

## 2021-11-02 NOTE — TELEPHONE ENCOUNTER
----- Message from Deion Lynn sent at 11/2/2021 12:22 PM EDT -----  Subject: Message to Provider    QUESTIONS  Information for Provider? Pt was contacted by mail to set up an appt for a   \"cancer screening\". She would like to schedule that once she knows what it   means.   ---------------------------------------------------------------------------  --------------  CALL BACK INFO  What is the best way for the office to contact you? OK to leave message on   voicemail  Preferred Call Back Phone Number? [phone]  ---------------------------------------------------------------------------  --------------  SCRIPT ANSWERS  Relationship to Patient?  Self

## 2021-11-02 NOTE — TELEPHONE ENCOUNTER
----- Message from Viola Bosch sent at 11/2/2021 12:22 PM EDT -----  Subject: Message to Provider    QUESTIONS  Information for Provider? Pt was contacted by mail to set up an appt for a   \"cancer screening\". She would like to schedule that once she knows what it   means.   ---------------------------------------------------------------------------  --------------  CALL BACK INFO  What is the best way for the office to contact you? OK to leave message on   voicemail  Preferred Call Back Phone Number? [phone]  ---------------------------------------------------------------------------  --------------  SCRIPT ANSWERS  Relationship to Patient?  Self

## 2021-11-02 NOTE — TELEPHONE ENCOUNTER
Patient called back. She wasn't sure what the call was in regards to either, however; she does have a mammogram scheduled for 11/16/2021 so its possible it may be in regards to that.

## 2021-11-16 LAB — MAMMOGRAPHY, EXTERNAL: NORMAL

## 2021-11-18 ENCOUNTER — OFFICE VISIT (OUTPATIENT)
Dept: FAMILY MEDICINE CLINIC | Age: 56
End: 2021-11-18
Payer: COMMERCIAL

## 2021-11-18 ENCOUNTER — HOSPITAL ENCOUNTER (OUTPATIENT)
Age: 56
Setting detail: SPECIMEN
Discharge: HOME | End: 2021-11-18

## 2021-11-18 VITALS
TEMPERATURE: 97.8 F | OXYGEN SATURATION: 98 % | WEIGHT: 178 LBS | SYSTOLIC BLOOD PRESSURE: 110 MMHG | DIASTOLIC BLOOD PRESSURE: 76 MMHG | BODY MASS INDEX: 30.39 KG/M2 | HEART RATE: 88 BPM | HEIGHT: 64 IN

## 2021-11-18 DIAGNOSIS — Z12.4 PAP SMEAR FOR CERVICAL CANCER SCREENING: Primary | ICD-10-CM

## 2021-11-18 DIAGNOSIS — R05.9 COUGH: ICD-10-CM

## 2021-11-18 PROCEDURE — 99396 PREV VISIT EST AGE 40-64: CPT | Performed by: PHYSICIAN ASSISTANT

## 2021-11-18 RX ORDER — BENZONATATE 100 MG/1
CAPSULE ORAL
Qty: 21 CAPSULE | Refills: 1 | Status: SHIPPED | OUTPATIENT
Start: 2021-11-18

## 2021-11-18 NOTE — PROGRESS NOTES
Apply to right knee 1 Device 0     No current facility-administered medications for this visit. Allergies   Allergen Reactions    Latex Itching    Aleve [Naproxen] Itching    Other      Allergy-Excedrin     Sulfa Antibiotics     Doxycycline Rash       Health Maintenance   Topic Date Due    Creatinine monitoring  Never done    Hepatitis A vaccine (1 of 2 - Risk 2-dose series) Never done    Pneumococcal 0-64 years Vaccine (1 of 2 - PPSV23) Never done    COVID-19 Vaccine (1) Never done    HIV screen  Never done    Hepatitis B vaccine (1 of 3 - Risk 3-dose series) Never done    Cervical cancer screen  Never done    Diabetes screen  Never done    Shingles Vaccine (1 of 2) Never done    Potassium monitoring  12/05/2018    Lipid screen  10/20/2020    Flu vaccine (1) Never done    Colon cancer screen fecal DNA test (Cologuard)  04/16/2022    Breast cancer screen  10/30/2022    DTaP/Tdap/Td vaccine (2 - Td or Tdap) 03/19/2029    Hepatitis C screen  Completed    Hib vaccine  Aged Out    Meningococcal (ACWY) vaccine  Aged Out       Subjective:      no abnormal bleeding, pelvic pain or discharge, no breast pain or new or enlarging lumps on self exam, no vaginal bleeding, no discharge or pelvic pain. No new partners. No present concerns.        Objective:   /76 (Site: Left Upper Arm, Position: Sitting, Cuff Size: Large Adult)   Pulse 88   Temp 97.8 °F (36.6 °C) (Tympanic)   Ht 5' 4\" (1.626 m)   Wt 178 lb (80.7 kg)   SpO2 98%   BMI 30.55 kg/m²     General Appearance: alert and oriented to person, place and time, well developed and well- nourished, in no acute distress  Skin: warm and dry, no rash or erythema  Head: normocephalic and atraumatic  Pulmonary/Chest: clear to auscultation bilaterally- no wheezes, rales or rhonchi, normal air movement, no respiratory distress  Cardiovascular: normal rate, regular rhythm, normal S1 and S2, no murmurs, rubs, clicks, or gallops  Abdomen: soft, non-tender, non-distended, normal bowel sounds, no masses or organomegaly  Pelvic: normal external genitalia, vulva, vagina, cervix, uterus and adnexa. No CMT. No noted discharge or lesions. No masses noted. Exam difficult due to limited body position with patients CVA history and comfort on table. Breast: patient declined exam  Psych: pleasant, cooperative      Assessment:    annual pap exam   Diagnosis Orders   1. Pap smear for cervical cancer screening  PAP Smear   2. Cough  benzonatate (TESSALON) 100 MG capsule       Plan:      Return in about 1 year (around 11/18/2022) for annual exam.     Office will call pt with pap results in the next 10 days. If no call received pt agreed to call in the next 2 weeks for results. Refill on tessalon, patient states uses occasionally since CVA. Cont with breast specialist as planned. Unable to view mammogram results yet  Patient agreed with treatment plan    Orders Placed This Encounter   Procedures    PAP Smear     Patient History:    No LMP recorded. Patient is postmenopausal.  OBGYN Status: Postmenopausal  Past Surgical History:  2015: LIVER SURGERY      Social History    Tobacco Use      Smoking status: Never Smoker      Smokeless tobacco: Never Used       Standing Status:   Future     Standing Expiration Date:   11/18/2022     Order Specific Question:   Collection Type     Answer: Thin Prep     Order Specific Question:   Prior Abnormal Pap Test     Answer:   No     Order Specific Question:   Screening or Diagnostic     Answer:   Screening     Order Specific Question:   HPV Requested? Answer:   Yes -  If ASCUS Reflex HPV     Order Specific Question:   High Risk Patient     Answer:   N/A     Orders Placed This Encounter   Medications    benzonatate (TESSALON) 100 MG capsule     Sig: TAKE 1 CAPSULE BY MOUTH THREE TIMES DAILY AS NEEDED FOR COUGH     Dispense:  21 capsule     Refill:  1       Patient given educational materials - see patient instructions. Discussed use, benefit, and side effects of prescribed medications. All patient questions answered. Pt voiced understanding. Reviewed health maintenance. Instructed to continue current medications, diet and exercise. Patient agreed with treatment plan. Follow up as directed below.      Electronically signed by Manan Mckee PA-C, PAC on 11/18/2021 at 4:22 PM

## 2021-11-18 NOTE — PROGRESS NOTES
Visit Information    Have you changed or started any medications since your last visit including any over-the-counter medicines, vitamins, or herbal medicines? no   Are you having any side effects from any of your medications? -  no  Have you stopped taking any of your medications? Is so, why? -  no    Have you seen any other physician or provider since your last visit? No  Have you had any other diagnostic tests since your last visit? No  Have you been seen in the emergency room and/or had an admission to a hospital since we last saw you? No  Have you had your routine dental cleaning in the past 6 months? no    Have you activated your [id number] account? If not, what are your barriers?  Yes     Patient Care Team:  LUCY Ewing CNP as PCP - General (Certified Nurse Practitioner)  LUCY Ewing CNP as PCP - [facility]    Medical History Review  Past Medical, Family, and Social History reviewed and  contribute to the patient presenting condition    Health Maintenance   Topic Date Due    Creatinine monitoring  Never done    Hepatitis A vaccine (1 of 2 - Risk 2-dose series) Never done    Pneumococcal 0-64 years Vaccine (1 of 2 - PPSV23) Never done    COVID-19 Vaccine (1) Never done    HIV screen  Never done    Hepatitis B vaccine (1 of 3 - Risk 3-dose series) Never done    Cervical cancer screen  Never done    Diabetes screen  Never done    Shingles Vaccine (1 of 2) Never done    Potassium monitoring  12/05/2018    Lipid screen  10/20/2020    Flu vaccine (1) Never done    Colon cancer screen fecal DNA test (Cologuard)  04/16/2022    Breast cancer screen  10/30/2022    DTaP/Tdap/Td vaccine (2 - Td or Tdap) 03/19/2029    Hepatitis C screen  Completed    Hib vaccine  Aged Out    Meningococcal (ACWY) vaccine  Aged Out

## 2021-11-30 LAB — CYTOLOGY REPORT: NORMAL

## 2022-05-08 DIAGNOSIS — F51.01 PRIMARY INSOMNIA: ICD-10-CM

## 2022-05-08 DIAGNOSIS — R51.9 NONINTRACTABLE HEADACHE, UNSPECIFIED CHRONICITY PATTERN, UNSPECIFIED HEADACHE TYPE: ICD-10-CM

## 2022-05-09 RX ORDER — AMITRIPTYLINE HYDROCHLORIDE 25 MG/1
TABLET, FILM COATED ORAL
Qty: 90 TABLET | Refills: 1 | Status: SHIPPED | OUTPATIENT
Start: 2022-05-09 | End: 2022-07-28 | Stop reason: SDUPTHER

## 2022-07-28 ENCOUNTER — OFFICE VISIT (OUTPATIENT)
Dept: FAMILY MEDICINE CLINIC | Age: 57
End: 2022-07-28
Payer: COMMERCIAL

## 2022-07-28 ENCOUNTER — HOSPITAL ENCOUNTER (OUTPATIENT)
Age: 57
Setting detail: SPECIMEN
Discharge: HOME | End: 2022-07-28

## 2022-07-28 VITALS
HEART RATE: 89 BPM | SYSTOLIC BLOOD PRESSURE: 122 MMHG | HEIGHT: 64 IN | WEIGHT: 170 LBS | BODY MASS INDEX: 29.02 KG/M2 | DIASTOLIC BLOOD PRESSURE: 78 MMHG | TEMPERATURE: 97 F | OXYGEN SATURATION: 98 %

## 2022-07-28 DIAGNOSIS — Z13.220 LIPID SCREENING: ICD-10-CM

## 2022-07-28 DIAGNOSIS — I63.9 CEREBROVASCULAR ACCIDENT (CVA), UNSPECIFIED MECHANISM (HCC): ICD-10-CM

## 2022-07-28 DIAGNOSIS — Z00.00 ROUTINE GENERAL MEDICAL EXAMINATION AT A HEALTH CARE FACILITY: ICD-10-CM

## 2022-07-28 DIAGNOSIS — Z13.1 DIABETES MELLITUS SCREENING: ICD-10-CM

## 2022-07-28 DIAGNOSIS — R51.9 NONINTRACTABLE HEADACHE, UNSPECIFIED CHRONICITY PATTERN, UNSPECIFIED HEADACHE TYPE: ICD-10-CM

## 2022-07-28 DIAGNOSIS — Z01.818 PRE-OP EXAM: Primary | ICD-10-CM

## 2022-07-28 DIAGNOSIS — Z01.818 PRE-OP EXAM: ICD-10-CM

## 2022-07-28 DIAGNOSIS — F51.01 PRIMARY INSOMNIA: ICD-10-CM

## 2022-07-28 LAB
ABSOLUTE EOS #: 0.09 K/UL (ref 0–0.44)
ABSOLUTE IMMATURE GRANULOCYTE: <0.03 K/UL (ref 0–0.3)
ABSOLUTE LYMPH #: 1.43 K/UL (ref 1.1–3.7)
ABSOLUTE MONO #: 0.46 K/UL (ref 0.1–1.2)
ANION GAP SERPL CALCULATED.3IONS-SCNC: 13 MMOL/L (ref 9–17)
BASOPHILS # BLD: 1 % (ref 0–2)
BASOPHILS ABSOLUTE: 0.05 K/UL (ref 0–0.2)
BUN BLDV-MCNC: 7 MG/DL (ref 6–20)
CALCIUM SERPL-MCNC: 9.5 MG/DL (ref 8.6–10.4)
CHLORIDE BLD-SCNC: 104 MMOL/L (ref 98–107)
CO2: 23 MMOL/L (ref 20–31)
CREAT SERPL-MCNC: 0.46 MG/DL (ref 0.5–0.9)
EOSINOPHILS RELATIVE PERCENT: 2 % (ref 1–4)
GFR AFRICAN AMERICAN: >60 ML/MIN
GFR NON-AFRICAN AMERICAN: >60 ML/MIN
GFR SERPL CREATININE-BSD FRML MDRD: ABNORMAL ML/MIN/{1.73_M2}
GLUCOSE BLD-MCNC: 98 MG/DL (ref 70–99)
HCT VFR BLD CALC: 42.8 % (ref 36.3–47.1)
HEMOGLOBIN: 14 G/DL (ref 11.9–15.1)
IMMATURE GRANULOCYTES: 0 %
INR BLD: 1.1
LYMPHOCYTES # BLD: 31 % (ref 24–43)
MCH RBC QN AUTO: 34 PG (ref 25.2–33.5)
MCHC RBC AUTO-ENTMCNC: 32.7 G/DL (ref 28.4–34.8)
MCV RBC AUTO: 103.9 FL (ref 82.6–102.9)
MONOCYTES # BLD: 10 % (ref 3–12)
NRBC AUTOMATED: 0 PER 100 WBC
PDW BLD-RTO: 13 % (ref 11.8–14.4)
PLATELET # BLD: 157 K/UL (ref 138–453)
PMV BLD AUTO: 10.6 FL (ref 8.1–13.5)
POTASSIUM SERPL-SCNC: 4.2 MMOL/L (ref 3.7–5.3)
PROTHROMBIN TIME: 11.2 SEC (ref 9.1–12.3)
RBC # BLD: 4.12 M/UL (ref 3.95–5.11)
RBC # BLD: ABNORMAL 10*6/UL
SEG NEUTROPHILS: 56 % (ref 36–65)
SEGMENTED NEUTROPHILS ABSOLUTE COUNT: 2.57 K/UL (ref 1.5–8.1)
SODIUM BLD-SCNC: 140 MMOL/L (ref 135–144)
WBC # BLD: 4.6 K/UL (ref 3.5–11.3)

## 2022-07-28 PROCEDURE — 99213 OFFICE O/P EST LOW 20 MIN: CPT | Performed by: NURSE PRACTITIONER

## 2022-07-28 RX ORDER — AMITRIPTYLINE HYDROCHLORIDE 25 MG/1
TABLET, FILM COATED ORAL
Qty: 90 TABLET | Refills: 1 | Status: SHIPPED | OUTPATIENT
Start: 2022-07-28 | End: 2022-10-21 | Stop reason: SDUPTHER

## 2022-07-28 SDOH — ECONOMIC STABILITY: FOOD INSECURITY: WITHIN THE PAST 12 MONTHS, THE FOOD YOU BOUGHT JUST DIDN'T LAST AND YOU DIDN'T HAVE MONEY TO GET MORE.: NEVER TRUE

## 2022-07-28 SDOH — ECONOMIC STABILITY: FOOD INSECURITY: WITHIN THE PAST 12 MONTHS, YOU WORRIED THAT YOUR FOOD WOULD RUN OUT BEFORE YOU GOT MONEY TO BUY MORE.: NEVER TRUE

## 2022-07-28 ASSESSMENT — ENCOUNTER SYMPTOMS
COUGH: 0
SORE THROAT: 0
SINUS PAIN: 0
NAUSEA: 0
ABDOMINAL PAIN: 0
DIARRHEA: 0
SHORTNESS OF BREATH: 0
VOMITING: 0

## 2022-07-28 ASSESSMENT — PATIENT HEALTH QUESTIONNAIRE - PHQ9
SUM OF ALL RESPONSES TO PHQ QUESTIONS 1-9: 0
1. LITTLE INTEREST OR PLEASURE IN DOING THINGS: 0
SUM OF ALL RESPONSES TO PHQ QUESTIONS 1-9: 0
SUM OF ALL RESPONSES TO PHQ9 QUESTIONS 1 & 2: 0
2. FEELING DOWN, DEPRESSED OR HOPELESS: 0

## 2022-07-28 ASSESSMENT — SOCIAL DETERMINANTS OF HEALTH (SDOH): HOW HARD IS IT FOR YOU TO PAY FOR THE VERY BASICS LIKE FOOD, HOUSING, MEDICAL CARE, AND HEATING?: NOT HARD AT ALL

## 2022-07-28 NOTE — PROGRESS NOTES
Visit Information    Have you changed or started any medications since your last visit including any over-the-counter medicines, vitamins, or herbal medicines? no   Have you stopped taking any of your medications? Is so, why? -  no  Are you having any side effects from any of your medications? - no    Have you seen any other physician or provider since your last visit?  no   Have you had any other diagnostic tests since your last visit?  no   Have you been seen in the emergency room and/or had an admission in a hospital since we last saw you?  no   Have you had your routine dental cleaning in the past 6 months?  no     Do you have an active MyChart account? If no, what is the barrier?   Yes    Patient Care Team:  LUCY Whittaker CNP as PCP - General (Certified Nurse Practitioner)  LUCY Whittaker CNP as PCP - [facility]    Medical History Review  Past Medical, Family, and Social History reviewed and  contribute to the patient presenting condition    Health Maintenance   Topic Date Due    COVID-19 Vaccine (1) Never done    Hepatitis A vaccine (1 of 2 - Risk 2-dose series) Never done    Pneumococcal 0-64 years Vaccine (1 - PCV) Never done    HIV screen  Never done    Hepatitis B vaccine (1 of 3 - Risk 3-dose series) Never done    Diabetes screen  Never done    Shingles vaccine (1 of 2) Never done    Depression Screen  01/20/2022    Colorectal Cancer Screen  04/16/2022    Flu vaccine (1) 09/01/2022    Breast cancer screen  11/16/2023    Lipids  06/22/2024    Cervical cancer screen  11/18/2024    DTaP/Tdap/Td vaccine (2 - Td or Tdap) 03/19/2029    Hepatitis C screen  Completed    Hib vaccine  Aged Out    Meningococcal (ACWY) vaccine  Aged Out

## 2022-07-28 NOTE — PROGRESS NOTES
[facility]  [address]  [address] [place] [id number]  Dept: [phone]  Dept Fax: [phone]    Saud Ireland is a 64 y.o. female who presents today for her medicalconditions/complaints as noted below. Saud Ireland is c/o of Surgical Clearance (To reduce and lift/augmentation,prior implants removed. Surgery 08/02. Had EKG 05/2022 [facility] and patient states it was normal.  Letter to be faxed to [phone])      HPI:     79-year-old female patient presents with complaints of encounter to follow up     History of GERD, cirrhosis. [facility] Current medication regimen includes omeprazole, lactulose, spironolactone     History of stroke with right-sided upper extremity deficit. Follows with [facility] neurology. Still doing some PT at home. History of anxiety currently treats with amitriptyline 25 mg nightly, stable     Reportedly has upcoming breast augmentation revision surgery with plastic surgeon. Had implant removal, now has excess skin, needs clearance for skin resection. Past Medical History:   Diagnosis Date    Anemia     Anxiety     Cirrhosis of liver (HCC)     Indigestion     Stroke (cerebrum) (HCC)         Current Outpatient Medications   Medication Sig Dispense Refill    amitriptyline (ELAVIL) 25 MG tablet TAKE 1 TABLET BY MOUTH EVERY EVENING 90 tablet 1    omeprazole 20 MG EC tablet TK 1 T PO QD  0    multivitamin (OCUVITE) TABS per tablet Take 1 tablet by mouth daily 30 tablet 3    benzonatate (TESSALON) 100 MG capsule TAKE 1 CAPSULE BY MOUTH THREE TIMES DAILY AS NEEDED FOR COUGH 21 capsule 1    lactulose encephalopathy (GENERLAC) 10 GM/15ML SOLN solution Take 15 mLs by mouth 2 times daily 900 mL 0    Misc. Devices MISC 1 each by Does not apply route once for 1 dose OA reaction brace for the right knee. Wear brace as directed by physician.   Apply to right knee 1 Device 0 spironolactone (ALDACTONE) 100 MG tablet Take 1 tablet by mouth daily  0     No current facility-administered medications for this visit. Allergies   Allergen Reactions    Latex Itching    Aleve [Naproxen] Itching    Other      Allergy-Excedrin     Sulfa Antibiotics     Doxycycline Rash       Subjective:      Review of Systems   Constitutional:  Negative for chills and fever. HENT:  Negative for ear pain, sinus pain and sore throat. Respiratory:  Negative for cough and shortness of breath. Cardiovascular:  Negative for chest pain and palpitations. Gastrointestinal:  Negative for abdominal pain, diarrhea, nausea and vomiting. Neurological:  Negative for dizziness and headaches. All other systems reviewed and are negative.    :Objective     Physical Exam  Vitals and nursing note reviewed. Constitutional:       Appearance: Normal appearance. Cardiovascular:      Rate and Rhythm: Normal rate. Pulmonary:      Effort: Pulmonary effort is normal.      Breath sounds: Normal breath sounds. Skin:     General: Skin is warm and dry. Neurological:      Mental Status: She is alert and oriented to person, place, and time. Mental status is at baseline. /78 (Site: Left Upper Arm, Position: Sitting, Cuff Size: Medium Adult)   Pulse 89   Temp 97 °F (36.1 °C) (Tympanic)   Ht 5' 4\" (1.626 m)   Wt 170 lb (77.1 kg)   SpO2 98%   BMI 29.18 kg/m²     Lab Review   No visits with results within 6 Month(s) from this visit. Latest known visit with results is:   Hospital Outpatient Visit on 11/18/2021   Component Date Value    Cytology Report 11/18/2021                      Value:INTERPRETATION    Cervical material, (ThinPrep vial, Imaging-assisted review):  Specimen Adequacy:       Satisfactory for evaluation. Descriptive Diagnosis:       Negative for intraepithelial lesion or malignancy.           Cytotechnologist:   Autumn CHAMBERLAIN(ASCP)  **Electronically Signed Out**  ey/11/30/2021            Source:  1: Cervical material, (ThinPrep vial, Imaging-assisted review)    Clinical History  Postmenopausal  Z12.4 Encounter for screening for malignant neoplasm of cervix  High Risk HPV DNA testing is requested if the diagnosis is ASC-US    GYNECOLOGIC CYTOLOGY REPORT    Patient Name: Gilford Alexandria. Med Rec: [id number]  Path Number: [id number]  [address] [place] [address], [place] [address]. [place], [address]  [phone]  Fax: [phone]         Assessment and Plan      1. Pre-op exam  -     Hemoglobin A1C; Future  -     CBC with Auto Differential; Future  -     Basic Metabolic Panel; Future  -     Protime-INR; Future  2. Routine general medical examination at a health care facility  -     Hemoglobin A1C; Future  3. Lipid screening  4. Diabetes mellitus screening  -     Hemoglobin A1C; Future  5. Primary insomnia  -     amitriptyline (ELAVIL) 25 MG tablet; TAKE 1 TABLET BY MOUTH EVERY EVENING, Disp-90 tablet, R-1Normal  6. Nonintractable headache, unspecified chronicity pattern, unspecified headache type  -     amitriptyline (ELAVIL) 25 MG tablet; TAKE 1 TABLET BY MOUTH EVERY EVENING, Disp-90 tablet, R-1Normal  7. Cerebrovascular accident (CVA), unspecified mechanism ([facility] [address]       Labs ordered  Await ekg from may to review  Refill on amitriptyline  Surgical clearance pending results. No results found for this visit on 07/28/22. Return if symptoms worsen or fail to improve. Orders Placed This Encounter   Medications    amitriptyline (ELAVIL) 25 MG tablet     Sig: TAKE 1 TABLET BY MOUTH EVERY EVENING     Dispense:  90 tablet     Refill:  1        Patient given educational materials - see patient instructions. Discussed use, benefit, and side effects of prescribed medications. All patientquestions answered. Pt voiced understanding.     Patient given educational materials - see patient instructions. Discussed use, benefit, and side effects of prescribed medications. All patientquestions answered. Pt voiced understanding. This note was transcribed using dictation with Dragon services. Efforts were made to correct any errors but some words may be misinterpreted.     Patient assumes risks associated with failure to complete recommended testing and treatments in a timely manner    Electronically signed by LUCY Ding CNP on 7/28/2022at 3:33 PM

## 2022-07-29 DIAGNOSIS — R71.8 ELEVATED MCV: Primary | ICD-10-CM

## 2022-07-29 LAB
ESTIMATED AVERAGE GLUCOSE: 94 MG/DL
HBA1C MFR BLD: 4.9 % (ref 4–6)

## 2022-10-21 DIAGNOSIS — R51.9 NONINTRACTABLE HEADACHE, UNSPECIFIED CHRONICITY PATTERN, UNSPECIFIED HEADACHE TYPE: ICD-10-CM

## 2022-10-21 DIAGNOSIS — F51.01 PRIMARY INSOMNIA: ICD-10-CM

## 2022-10-21 DIAGNOSIS — L30.9 ECZEMA, UNSPECIFIED TYPE: Primary | ICD-10-CM

## 2022-10-21 RX ORDER — AMITRIPTYLINE HYDROCHLORIDE 25 MG/1
TABLET, FILM COATED ORAL
Qty: 90 TABLET | Refills: 1 | Status: SHIPPED | OUTPATIENT
Start: 2022-10-21

## 2022-10-21 NOTE — TELEPHONE ENCOUNTER
Name of Medication? Other - permethrin (ELIMITE) 5 % cream [[id number]]  Patient-reported dosage and instructions? as directed  How many days do you have left? 0  [facility]? [id number]  Pharmacy phone number (if available)? [phone]  Additional Information for Provider? used for eczema.

## 2022-10-21 NOTE — TELEPHONE ENCOUNTER
----- Message from Kane Lomeli sent at 10/21/2022  8:10 AM EDT -----  Subject: Refill Request    QUESTIONS  Name of Medication? amitriptyline (ELAVIL) 25 MG tablet  Patient-reported dosage and instructions? once daily  How many days do you have left? 15  Preferred Pharmacy? [facility] #[id number]  Pharmacy phone number (if available)? [phone]  ---------------------------------------------------------------------------  --------------,  Name of Medication? Other - permethrin (ELIMITE) 5 % cream [[id number]]  Patient-reported dosage and instructions? as directed  How many days do you have left? 0  Preferred Pharmacy? [facility] #[id number]  Pharmacy phone number (if available)? [phone]  Additional Information for Provider? used for eczema. ---------------------------------------------------------------------------  --------------  Aretta Kill INFO  What is the best way for the office to contact you? OK to leave message on   voicemail  Preferred Call Back Phone Number? [phone]  ---------------------------------------------------------------------------  --------------  SCRIPT ANSWERS  Relationship to Patient?  Self

## 2022-10-21 NOTE — TELEPHONE ENCOUNTER
Can you clarify for me as we havent prescribed since 2018 and med typically treated for scabies and lice not eczema

## 2022-10-23 RX ORDER — TRIAMCINOLONE ACETONIDE 5 MG/G
CREAM TOPICAL
Qty: 30 G | Refills: 0 | Status: SHIPPED | OUTPATIENT
Start: 2022-10-23

## 2022-11-11 ENCOUNTER — OFFICE VISIT (OUTPATIENT)
Dept: PRIMARY CARE CLINIC | Age: 57
End: 2022-11-11
Payer: COMMERCIAL

## 2022-11-11 VITALS
WEIGHT: 170 LBS | HEIGHT: 64 IN | HEART RATE: 85 BPM | SYSTOLIC BLOOD PRESSURE: 115 MMHG | OXYGEN SATURATION: 96 % | DIASTOLIC BLOOD PRESSURE: 69 MMHG | BODY MASS INDEX: 29.02 KG/M2

## 2022-11-11 DIAGNOSIS — H10.32 ACUTE BACTERIAL CONJUNCTIVITIS OF LEFT EYE: Primary | ICD-10-CM

## 2022-11-11 PROCEDURE — 99213 OFFICE O/P EST LOW 20 MIN: CPT

## 2022-11-11 RX ORDER — POLYMYXIN B SULFATE AND TRIMETHOPRIM 1; 10000 MG/ML; [USP'U]/ML
1 SOLUTION OPHTHALMIC EVERY 4 HOURS
Qty: 10 ML | Refills: 0 | Status: SHIPPED | OUTPATIENT
Start: 2022-11-11

## 2022-11-11 ASSESSMENT — ENCOUNTER SYMPTOMS
EYE ITCHING: 0
GASTROINTESTINAL NEGATIVE: 1
EYE PAIN: 0
EYE DISCHARGE: 1
BLOOD IN STOOL: 0
ABDOMINAL PAIN: 0
CHEST TIGHTNESS: 0
RECTAL PAIN: 0
ORTHOPNEA: 0
TROUBLE SWALLOWING: 0
ANAL BLEEDING: 0
SINUS PAIN: 0
APNEA: 0
VOICE CHANGE: 0
SINUS PRESSURE: 0
RESPIRATORY NEGATIVE: 1
COLOR CHANGE: 0
DOUBLE VISION: 0
PHOTOPHOBIA: 0
SORE THROAT: 0
SWOLLEN GLANDS: 0
CONSTIPATION: 0
ABDOMINAL DISTENTION: 0
WHEEZING: 0
BACK PAIN: 0
COUGH: 0
NAUSEA: 0
DIARRHEA: 0
STRIDOR: 0
RHINORRHEA: 0
FACIAL SWELLING: 0
VOMITING: 0
SHORTNESS OF BREATH: 0
CHOKING: 0
EYE REDNESS: 1

## 2022-11-11 NOTE — PROGRESS NOTES
[address] [place] [facility] [address]    [address] WALK IN CARE  [address] [place] [address] [address] [facility]  Dept: [phone]    Krishna Murphy is a 64 y.o. female Established patient, who presents to the walk-in clinic today with conditions/complaints as noted below:    Chief Complaint   Patient presents with    Conjunctivitis     Pt has been having congestion in left eye x 2 days          HPI:     Conjunctivitis   The current episode started yesterday. The onset was sudden. The problem occurs continuously. The problem has been rapidly worsening. The problem is severe. Nothing relieves the symptoms. Nothing aggravates the symptoms. Associated symptoms include eye discharge and eye redness. Pertinent negatives include no orthopnea, no fever, no decreased vision, no double vision, no eye itching, no photophobia, no abdominal pain, no constipation, no diarrhea, no nausea, no vomiting, no congestion, no ear discharge, no ear pain, no headaches, no hearing loss, no mouth sores, no rhinorrhea, no sore throat, no stridor, no swollen glands, no muscle aches, no neck pain, no neck stiffness, no cough, no URI, no wheezing, no rash, no diaper rash and no eye pain. The eyelid exhibits swelling. Past Medical History:   Diagnosis Date    Anemia     Anxiety     Cirrhosis of liver (HCC)     Indigestion     Stroke (cerebrum) (HCC)        Current Outpatient Medications   Medication Sig Dispense Refill    trimethoprim-polymyxin b (POLYTRIM) 41280-6.1 UNIT/ML-% ophthalmic solution Place 1 drop into the left eye every 4 hours 10 mL 0    triamcinolone (ARISTOCORT) 0.5 % cream Apply topically 3 times daily.  30 g 0    amitriptyline (ELAVIL) 25 MG tablet TAKE 1 TABLET BY MOUTH EVERY EVENING 90 tablet 1    benzonatate (TESSALON) 100 MG capsule TAKE 1 CAPSULE BY MOUTH THREE TIMES DAILY AS NEEDED FOR COUGH 21 capsule 1    lactulose encephalopathy (GENERLAC) 10 GM/15ML SOLN solution Take 15 mLs by mouth 2 times daily 900 mL 0    omeprazole 20 MG EC tablet TK 1 T PO QD  0    Misc. Devices MISC 1 each by Does not apply route once for 1 dose OA reaction brace for the right knee. Wear brace as directed by physician. Apply to right knee 1 Device 0    spironolactone (ALDACTONE) 100 MG tablet Take 1 tablet by mouth daily  0    multivitamin (OCUVITE) TABS per tablet Take 1 tablet by mouth daily 30 tablet 3     No current facility-administered medications for this visit. Allergies   Allergen Reactions    Latex Itching    Aleve [Naproxen] Itching    Other      Allergy-Excedrin     Sulfa Antibiotics     Doxycycline Rash       Review of Systems:     Review of Systems   Constitutional: Negative. Negative for activity change, appetite change, chills, diaphoresis, fatigue, fever and unexpected weight change. HENT: Negative. Negative for congestion, dental problem, drooling, ear discharge, ear pain, facial swelling, hearing loss, mouth sores, nosebleeds, postnasal drip, rhinorrhea, sinus pressure, sinus pain, sneezing, sore throat, tinnitus, trouble swallowing and voice change. Eyes:  Positive for discharge and redness. Negative for double vision, photophobia, pain, itching and visual disturbance. Respiratory: Negative. Negative for apnea, cough, choking, chest tightness, shortness of breath, wheezing and stridor. Cardiovascular: Negative. Negative for chest pain, palpitations, orthopnea and leg swelling. Gastrointestinal: Negative. Negative for abdominal distention, abdominal pain, anal bleeding, blood in stool, constipation, diarrhea, nausea, rectal pain and vomiting. Musculoskeletal: Negative. Negative for arthralgias, back pain, gait problem, joint swelling, myalgias, neck pain and neck stiffness. Skin: Negative. Negative for color change, pallor, rash and wound. Neurological: Negative. Negative for dizziness, tremors, seizures, syncope, facial asymmetry, speech difficulty, weakness, light-headedness, numbness and headaches. Physical Exam:      /69 (Site: Left Upper Arm, Position: Sitting, Cuff Size: Large Adult)   Pulse 85   Ht 5' 4\" (1.626 m)   Wt 170 lb (77.1 kg)   SpO2 96%   BMI 29.18 kg/m²     Physical Exam  Vitals reviewed. Constitutional:       Appearance: Normal appearance. HENT:      Head: Normocephalic and atraumatic. Right Ear: Tympanic membrane, ear canal and external ear normal.      Left Ear: Tympanic membrane, ear canal and external ear normal.      Nose: Nose normal.      Mouth/Throat:      Mouth: Mucous membranes are moist.      Pharynx: Oropharynx is clear. Eyes:      General:         Left eye: Discharge present. Extraocular Movements: Extraocular movements intact. Conjunctiva/sclera:      Left eye: Left conjunctiva is injected. Exudate present. Pupils: Pupils are equal, round, and reactive to light. Comments: Left eye is swollen shut due to crusty, purulent discharge. Eyelids are moderately swollen. Cardiovascular:      Rate and Rhythm: Normal rate and regular rhythm. Pulses: Normal pulses. Heart sounds: Normal heart sounds. Pulmonary:      Effort: Pulmonary effort is normal.      Breath sounds: Normal breath sounds. Abdominal:      General: Abdomen is flat. Bowel sounds are normal.      Palpations: Abdomen is soft. Musculoskeletal:         General: Normal range of motion. Cervical back: Normal range of motion and neck supple. Skin:     General: Skin is warm and dry. Capillary Refill: Capillary refill takes less than 2 seconds. Neurological:      General: No focal deficit present. Mental Status: She is alert and oriented to person, place, and time. Mental status is at baseline. Cranial Nerves: Cranial nerves 2-12 are intact. Sensory: Sensation is intact. Motor: Weakness present. Gait: Gait abnormal.      Comments: Pt uses a cane to walk r/t previous stroke. She is accompanied by her    Psychiatric:         Mood and Affect: Mood normal.         Behavior: Behavior normal.       Plan:          1. Acute bacterial conjunctivitis of left eye  -     trimethoprim-polymyxin b (POLYTRIM) 34275-6.1 UNIT/ML-% ophthalmic solution; Place 1 drop into the left eye every 4 hours, Disp-10 mL, R-0Print     Pt requesting printed Rx due to pharmacy request.  Continue over-the-counter medications as needed for symptoms such as eyewashes to flush eye and clean. Clean eye with lukewarm water from inner to outer canthus, dry and apply drops. Remain in laying position after eye drops for approx 5-10 min to let medication work. Go to the ER for shortness of breath, chest pain. Patient verbalized understanding. Follow Up Instructions:      Return if symptoms worsen or fail to improve, for SOB, chest pain go to ER. Orders Placed This Encounter   Medications    trimethoprim-polymyxin b (POLYTRIM) 76823-2.1 UNIT/ML-% ophthalmic solution     Sig: Place 1 drop into the left eye every 4 hours     Dispense:  10 mL     Refill:  0             Based on the clinical exam findings-- I will treat this as a bacterial conjunctivitis at this time with antibiotic eye gtts. Cool compresses to the eyes if desired. Good hand hygiene encouraged. Patient agreeable to treatment plan. Educational materials provided on AVS.  Follow up if symptoms do not improve/worsen. Patient and/or parent given educational materials - see patient instructions. Discussed use, benefit, and side effects of prescribed medications. All patient questions answered. Patient and/or parent voiced understanding.       Electronically signed by LUCY Fallon 11/11/2022 at 5:11 PM

## 2022-12-12 ENCOUNTER — OFFICE VISIT (OUTPATIENT)
Dept: FAMILY MEDICINE CLINIC | Age: 57
End: 2022-12-12
Payer: COMMERCIAL

## 2022-12-12 VITALS
OXYGEN SATURATION: 98 % | TEMPERATURE: 97.4 F | SYSTOLIC BLOOD PRESSURE: 126 MMHG | HEIGHT: 64 IN | DIASTOLIC BLOOD PRESSURE: 74 MMHG | HEART RATE: 95 BPM | WEIGHT: 165 LBS | BODY MASS INDEX: 28.17 KG/M2

## 2022-12-12 DIAGNOSIS — H10.32 ACUTE CONJUNCTIVITIS OF LEFT EYE, UNSPECIFIED ACUTE CONJUNCTIVITIS TYPE: Primary | ICD-10-CM

## 2022-12-12 PROCEDURE — 99213 OFFICE O/P EST LOW 20 MIN: CPT | Performed by: PHYSICIAN ASSISTANT

## 2022-12-12 RX ORDER — TOBRAMYCIN 3 MG/ML
1 SOLUTION/ DROPS OPHTHALMIC EVERY 4 HOURS
Qty: 1 EACH | Refills: 0 | Status: SHIPPED | OUTPATIENT
Start: 2022-12-12 | End: 2022-12-22

## 2022-12-12 ASSESSMENT — ENCOUNTER SYMPTOMS
PHOTOPHOBIA: 0
COLOR CHANGE: 0
EYE PAIN: 0
EYE REDNESS: 1
EYE DISCHARGE: 1
EYE ITCHING: 1

## 2022-12-12 NOTE — PROGRESS NOTES
Visit Information    Have you changed or started any medications since your last visit including any over-the-counter medicines, vitamins, or herbal medicines? no   Are you having any side effects from any of your medications? -  no  Have you stopped taking any of your medications? Is so, why? -  no    Have you seen any other physician or provider since your last visit? No  Have you had any other diagnostic tests since your last visit? No  Have you been seen in the emergency room and/or had an admission to a hospital since we last saw you? No  Have you had your routine dental cleaning in the past 6 months? no    Have you activated your [employer] account? If not, what are your barriers?  Yes     Patient Care Team:  LUCY Zuniga CNP as PCP - General (Certified Nurse Practitioner)  LUCY Zuniga CNP as PCP - [facility] Provider    Medical History Review  Past Medical, Family, and Social History reviewed and  contribute to the patient presenting condition    Health Maintenance   Topic Date Due    COVID-19 Vaccine (1) Never done    Hepatitis A vaccine (1 of 2 - Risk 2-dose series) Never done    Pneumococcal 0-64 years Vaccine (1 - PCV) Never done    HIV screen  Never done    Hepatitis B vaccine (1 of 3 - Risk 3-dose series) Never done    Shingles vaccine (1 of 2) Never done    Colorectal Cancer Screen  04/16/2022    Flu vaccine (1) Never done    Depression Screen  07/28/2023    Breast cancer screen  11/16/2023    Lipids  06/22/2024    Cervical cancer screen  11/18/2024    DTaP/Tdap/Td vaccine (2 - Td or Tdap) 03/19/2029    Hepatitis C screen  Completed    Hib vaccine  Aged Out    Meningococcal (ACWY) vaccine  Aged Out

## 2022-12-12 NOTE — PROGRESS NOTES
[address] WALK-IN [facility]  [address] [address] [facility] [address]  Dept: [phone]  Dept Fax: [phone]    Gurmeet Ty is a 64 y.o. female who presents today for her medical conditions/complaintsas noted below. Gurmeet Ty is c/o of   Chief Complaint   Patient presents with    Eye Problem     Left eye- drainage,itchy         HPI:     HPI    Patient here with her significant other today. She reports she was in the urgent care recently and then given polytrim for conjunctivitis but has seen no improvement. She reports the eye continues to be red, matted and goopy in the morning. There is no eye pain however it is itchy at times. She denies any change in vision. She is otherwise been feeling well.     Hemoglobin A1C (%)   Date Value   07/28/2022 4.9             ( goal A1Cis < 7)   No results found for: LABMICR  No results found for: LDLCHOLESTEROL, LDLCALC    (goal LDL is <100)   BUN (mg/dL)   Date Value   07/28/2022 7     BP Readings from Last 3 Encounters:   12/12/22 126/74   11/11/22 115/69   07/28/22 122/78          (goal 120/80)    Past Medical History:   Diagnosis Date    Anemia     Anxiety     Cirrhosis of liver (HCC)     Indigestion     Stroke (cerebrum) (HCC)       Past Surgical History:   Procedure Laterality Date    LIVER SURGERY  2015       Family History   Problem Relation Age of Onset    Migraines Paternal Grandmother     High Cholesterol Mother     Cancer Father     Heart Disease Father        Social History     Tobacco Use    Smoking status: Never    Smokeless tobacco: Never   Substance Use Topics    Alcohol use: No     Alcohol/week: 0.0 standard drinks      Current Outpatient Medications   Medication Sig Dispense Refill    tobramycin (TOBREX) 0.3 % ophthalmic solution Place 1 drop into the right eye every 4 hours for 10 days 1 each 0    trimethoprim-polymyxin b (POLYTRIM) 16876-0.1 UNIT/ML-% ophthalmic solution Place 1 drop into the left eye every 4 hours 10 mL 0    triamcinolone (ARISTOCORT) 0.5 % cream Apply topically 3 times daily. 30 g 0    amitriptyline (ELAVIL) 25 MG tablet TAKE 1 TABLET BY MOUTH EVERY EVENING 90 tablet 1    lactulose encephalopathy (GENERLAC) 10 GM/15ML SOLN solution Take 15 mLs by mouth 2 times daily 900 mL 0    omeprazole 20 MG EC tablet TK 1 T PO QD  0    spironolactone (ALDACTONE) 100 MG tablet Take 1 tablet by mouth daily  0    multivitamin (OCUVITE) TABS per tablet Take 1 tablet by mouth daily 30 tablet 3    benzonatate (TESSALON) 100 MG capsule TAKE 1 CAPSULE BY MOUTH THREE TIMES DAILY AS NEEDED FOR COUGH (Patient not taking: Reported on 12/12/2022) 21 capsule 1    Misc. Devices MISC 1 each by Does not apply route once for 1 dose OA reaction brace for the right knee. Wear brace as directed by physician. Apply to right knee 1 Device 0     No current facility-administered medications for this visit. Allergies   Allergen Reactions    Latex Itching    Aleve [Naproxen] Itching    Other      Allergy-Excedrin     Sulfa Antibiotics     Doxycycline Rash       Health Maintenance   Topic Date Due    COVID-19 Vaccine (1) Never done    Hepatitis A vaccine (1 of 2 - Risk 2-dose series) Never done    Pneumococcal 0-64 years Vaccine (1 - PCV) Never done    HIV screen  Never done    Hepatitis B vaccine (1 of 3 - Risk 3-dose series) Never done    Shingles vaccine (1 of 2) Never done    Colorectal Cancer Screen  04/16/2022    Flu vaccine (1) Never done    Depression Screen  07/28/2023    Breast cancer screen  11/16/2023    Lipids  06/22/2024    Cervical cancer screen  11/18/2024    DTaP/Tdap/Td vaccine (2 - Td or Tdap) 03/19/2029    Hepatitis C screen  Completed    Hib vaccine  Aged Out    Meningococcal (ACWY) vaccine  Aged Out       Subjective:     Review of Systems   Constitutional:  Negative for activity change, appetite change, fatigue, fever and unexpected weight change. HENT:  Negative for congestion.     Eyes: Positive for discharge, redness and itching. Negative for photophobia, pain and visual disturbance. Skin:  Negative for color change, pallor, rash and wound. Neurological:  Negative for weakness and numbness. Hematological:  Negative for adenopathy. Psychiatric/Behavioral:  The patient is not nervous/anxious. Objective:     Physical Exam  Vitals and nursing note reviewed. Constitutional:       General: She is not in acute distress. Appearance: Normal appearance. She is well-developed. She is not ill-appearing. HENT:      Head: Normocephalic and atraumatic. Eyes:      General: Lids are normal.         Right eye: No foreign body, discharge or hordeolum. Left eye: Discharge present. No foreign body or hordeolum. Extraocular Movements:      Right eye: Normal extraocular motion. Left eye: Normal extraocular motion. Comments: Left eye conjunctiva erythematous, matter on the lashes and purulent discharge both medial and lateral eye   Skin:     General: Skin is warm and dry. Coloration: Skin is not pale. Findings: No erythema or rash. Neurological:      Mental Status: She is alert and oriented to person, place, and time. Psychiatric:         Behavior: Behavior normal.         Thought Content: Thought content normal.         Judgment: Judgment normal.     /74 (Site: Left Upper Arm, Position: Sitting, Cuff Size: Large Adult)   Pulse 95   Temp 97.4 °F (36.3 °C) (Tympanic)   Ht 5' 4\" (1.626 m)   Wt 165 lb (74.8 kg)   SpO2 98%   BMI 28.32 kg/m²     Assessment:       Diagnosis Orders   1. Acute conjunctivitis of left eye, unspecified acute conjunctivitis type  tobramycin (TOBREX) 0.3 % ophthalmic solution                Plan:      Return if symptoms worsen or fail to improve. Discontinue Polytrim. Prescription for Tobrex sent to pharmacy. Encouraged her to throw out any make-up or recently used eye products. Rinse hands off after use.   If symptoms are persisting or worsening seek care  Patient and spouse agreed with treatment plan    Orders Placed This Encounter   Medications    tobramycin (TOBREX) 0.3 % ophthalmic solution     Sig: Place 1 drop into the right eye every 4 hours for 10 days     Dispense:  1 each     Refill:  0       Patient given educational materials - see patient instructions. Discussed use, benefit, and side effects of prescribed medications. All patientquestions answered. Pt voiced understanding. Reviewed health maintenance. Instructedto continue current medications, diet and exercise. Patient agreed with treatmentplan. Follow up as directed. Please note that this chart was generated using voice recognition Dragon dictation software. Although every effort was made to ensure the accuracy of this automated transcription, some errors in transcription may have occurred.      Electronically signed by Jagdish Escobedo PA-C on 12/12/2022 at 1:56 PM

## 2023-02-10 DIAGNOSIS — R51.9 NONINTRACTABLE HEADACHE, UNSPECIFIED CHRONICITY PATTERN, UNSPECIFIED HEADACHE TYPE: ICD-10-CM

## 2023-02-10 DIAGNOSIS — F51.01 PRIMARY INSOMNIA: ICD-10-CM

## 2023-02-12 RX ORDER — AMITRIPTYLINE HYDROCHLORIDE 25 MG/1
TABLET, FILM COATED ORAL
Qty: 90 TABLET | Refills: 1 | Status: SHIPPED | OUTPATIENT
Start: 2023-02-12

## 2023-02-21 ENCOUNTER — COMMUNITY OUTREACH (OUTPATIENT)
Dept: FAMILY MEDICINE CLINIC | Age: 58
End: 2023-02-21

## 2023-03-21 ENCOUNTER — TELEPHONE (OUTPATIENT)
Dept: FAMILY MEDICINE CLINIC | Age: 58
End: 2023-03-21

## 2023-03-21 DIAGNOSIS — R05.9 COUGH, UNSPECIFIED TYPE: Primary | ICD-10-CM

## 2023-03-21 RX ORDER — BENZONATATE 200 MG/1
200 CAPSULE ORAL 3 TIMES DAILY PRN
Qty: 30 CAPSULE | Refills: 0 | Status: SHIPPED | OUTPATIENT
Start: 2023-03-21 | End: 2023-03-28

## 2023-03-21 NOTE — TELEPHONE ENCOUNTER
Patient contacted the office and states that she has had a cough for one week. Patient is asking if PCP can send tessalon in for her. patient states the only symptom she has is a cough. Please advise.

## 2023-03-30 DIAGNOSIS — L30.9 ECZEMA, UNSPECIFIED TYPE: ICD-10-CM

## 2023-03-30 RX ORDER — TRIAMCINOLONE ACETONIDE 5 MG/G
CREAM TOPICAL
Qty: 30 G | Refills: 0 | Status: SHIPPED | OUTPATIENT
Start: 2023-03-30

## 2023-05-20 ENCOUNTER — OFFICE VISIT (OUTPATIENT)
Dept: PRIMARY CARE CLINIC | Age: 58
End: 2023-05-20
Payer: COMMERCIAL

## 2023-05-20 VITALS
DIASTOLIC BLOOD PRESSURE: 78 MMHG | HEART RATE: 74 BPM | BODY MASS INDEX: 30.39 KG/M2 | HEIGHT: 64 IN | SYSTOLIC BLOOD PRESSURE: 122 MMHG | OXYGEN SATURATION: 98 % | WEIGHT: 178 LBS | TEMPERATURE: 97.6 F

## 2023-05-20 DIAGNOSIS — H10.9 CONJUNCTIVITIS OF BOTH EYES, UNSPECIFIED CONJUNCTIVITIS TYPE: Primary | ICD-10-CM

## 2023-05-20 PROCEDURE — 99213 OFFICE O/P EST LOW 20 MIN: CPT | Performed by: FAMILY MEDICINE

## 2023-05-20 RX ORDER — BENZONATATE 200 MG/1
200 CAPSULE ORAL 3 TIMES DAILY PRN
Qty: 30 CAPSULE | Refills: 0 | Status: SHIPPED | OUTPATIENT
Start: 2023-05-20 | End: 2023-05-27

## 2023-05-20 RX ORDER — LATANOPROST 50 UG/ML
SOLUTION/ DROPS OPHTHALMIC
COMMUNITY
Start: 2023-05-18

## 2023-05-20 RX ORDER — POLYETHYLENE GLYCOL 3350, SODIUM CHLORIDE, SODIUM BICARBONATE, POTASSIUM CHLORIDE 420; 11.2; 5.72; 1.48 G/4L; G/4L; G/4L; G/4L
POWDER, FOR SOLUTION ORAL
COMMUNITY
Start: 2023-03-28

## 2023-05-20 RX ORDER — MOXIFLOXACIN 5 MG/ML
SOLUTION/ DROPS OPHTHALMIC
COMMUNITY
Start: 2023-02-13

## 2023-05-20 RX ORDER — LACTULOSE 10 G/15ML
SOLUTION ORAL
COMMUNITY
Start: 2023-05-06

## 2023-05-20 RX ORDER — MOXIFLOXACIN 5 MG/ML
1 SOLUTION/ DROPS OPHTHALMIC 4 TIMES DAILY
Qty: 3 ML | Refills: 1 | Status: SHIPPED | OUTPATIENT
Start: 2023-05-20 | End: 2023-05-27

## 2023-05-20 ASSESSMENT — ENCOUNTER SYMPTOMS
EYE DISCHARGE: 1
VOMITING: 0
EYE REDNESS: 1
EYE ITCHING: 0

## 2023-05-20 NOTE — PROGRESS NOTES
[address] WALK IN [facility]  [address] [place] [address]  Dept: [phone]  Dept Fax: [phone]    Jeffy Ott is a 62 y.o. female who presents today for her medical conditions/complaintsas noted below. Jeffy Ott is c/o of Eye Problem (Bilateral eyes, she said this happens all the time /She touches her dog than rubs her eye and it gets infected and irritated. She is out of the medication that she normally would get)        HPI:     Eye Problem   Both eyes are affected. This is a recurrent problem. The current episode started yesterday. The problem occurs constantly. The problem has been unchanged. There was no injury mechanism (usually after touching dog and then touching eyes). There is No known exposure to pink eye. She Does not wear contacts. Associated symptoms include an eye discharge and eye redness. Pertinent negatives include no fever, itching, recent URI or vomiting. She has tried nothing for the symptoms. The treatment provided no relief.      Past Medical History:   Diagnosis Date    Anemia     Anxiety     Cirrhosis of liver ([address]     Indigestion     Stroke (cerebrum) (HCC)     Past medical history reviewed and pertinent positives/negatives in the HPI    Past Surgical History:   Procedure Laterality Date    LIVER SURGERY  2015       Family History   Problem Relation Age of Onset    Migraines Paternal Grandmother     High Cholesterol Mother     Cancer Father     Heart Disease Father        Social History     Tobacco Use    Smoking status: Never    Smokeless tobacco: Never   Substance Use Topics    Alcohol use: No     Alcohol/week: 0.0 standard drinks      Current Outpatient Medications   Medication Sig Dispense Refill    latanoprost (XALATAN) 0.005 % ophthalmic solution INSTILL 1 DROP BOTH EYES EVERY NIGHT AT BEDTIME      moxifloxacin (VIGAMOX) 0.5 % ophthalmic solution INSTILL 1 DROP INTO THE LEFT EYE FOUR

## 2023-05-20 NOTE — PATIENT INSTRUCTIONS
Use eye drop as prescribed for eye infection  If symptoms worsen or do not improve please follow-up with PCP or return to clinic

## 2023-08-07 ENCOUNTER — OFFICE VISIT (OUTPATIENT)
Dept: FAMILY MEDICINE CLINIC | Age: 58
End: 2023-08-07
Payer: COMMERCIAL

## 2023-08-07 ENCOUNTER — HOSPITAL ENCOUNTER (OUTPATIENT)
Age: 58
Setting detail: SPECIMEN
Discharge: HOME | End: 2023-08-07

## 2023-08-07 VITALS
OXYGEN SATURATION: 97 % | TEMPERATURE: 97.8 F | SYSTOLIC BLOOD PRESSURE: 128 MMHG | BODY MASS INDEX: 30.55 KG/M2 | DIASTOLIC BLOOD PRESSURE: 82 MMHG | HEIGHT: 64 IN | HEART RATE: 85 BPM

## 2023-08-07 DIAGNOSIS — Z13.220 LIPID SCREENING: ICD-10-CM

## 2023-08-07 DIAGNOSIS — R18.8 CIRRHOSIS OF LIVER WITH ASCITES, UNSPECIFIED HEPATIC CIRRHOSIS TYPE (HCC): ICD-10-CM

## 2023-08-07 DIAGNOSIS — Z01.818 PRE-OP EXAM: Primary | ICD-10-CM

## 2023-08-07 DIAGNOSIS — K74.60 CIRRHOSIS OF LIVER WITH ASCITES, UNSPECIFIED HEPATIC CIRRHOSIS TYPE (HCC): ICD-10-CM

## 2023-08-07 DIAGNOSIS — Z13.1 DIABETES MELLITUS SCREENING: ICD-10-CM

## 2023-08-07 DIAGNOSIS — Z01.818 PRE-OP EXAM: ICD-10-CM

## 2023-08-07 LAB
ALBUMIN SERPL-MCNC: 4.2 G/DL (ref 3.5–5.2)
ALBUMIN/GLOB SERPL: 1.4 {RATIO} (ref 1–2.5)
ALP SERPL-CCNC: 109 U/L (ref 35–104)
ALT SERPL-CCNC: 25 U/L (ref 5–33)
ANION GAP SERPL CALCULATED.3IONS-SCNC: 16 MMOL/L (ref 9–17)
AST SERPL-CCNC: 33 U/L
BILIRUB SERPL-MCNC: 0.4 MG/DL (ref 0.3–1.2)
BUN SERPL-MCNC: 6 MG/DL (ref 6–20)
CALCIUM SERPL-MCNC: 9.9 MG/DL (ref 8.6–10.4)
CHLORIDE SERPL-SCNC: 106 MMOL/L (ref 98–107)
CHOLEST SERPL-MCNC: 164 MG/DL
CHOLESTEROL/HDL RATIO: 2.2
CO2 SERPL-SCNC: 20 MMOL/L (ref 20–31)
CREAT SERPL-MCNC: 0.5 MG/DL (ref 0.5–0.9)
ERYTHROCYTE [DISTWIDTH] IN BLOOD BY AUTOMATED COUNT: 12.8 % (ref 11.8–14.4)
EST. AVERAGE GLUCOSE BLD GHB EST-MCNC: 100 MG/DL
GFR SERPL CREATININE-BSD FRML MDRD: >60 ML/MIN/1.73M2
GLUCOSE SERPL-MCNC: 88 MG/DL (ref 70–99)
HBA1C MFR BLD: 5.1 % (ref 4–6)
HCT VFR BLD AUTO: 39.7 % (ref 36.3–47.1)
HDLC SERPL-MCNC: 75 MG/DL
HGB BLD-MCNC: 13.8 G/DL (ref 11.9–15.1)
LDLC SERPL CALC-MCNC: 80 MG/DL (ref 0–130)
MCH RBC QN AUTO: 34.2 PG (ref 25.2–33.5)
MCHC RBC AUTO-ENTMCNC: 34.8 G/DL (ref 28.4–34.8)
MCV RBC AUTO: 98.5 FL (ref 82.6–102.9)
NRBC BLD-RTO: 0 PER 100 WBC
PLATELET # BLD AUTO: 170 K/UL (ref 138–453)
PMV BLD AUTO: 10.4 FL (ref 8.1–13.5)
POTASSIUM SERPL-SCNC: 4.1 MMOL/L (ref 3.7–5.3)
PROT SERPL-MCNC: 7.2 G/DL (ref 6.4–8.3)
RBC # BLD AUTO: 4.03 M/UL (ref 3.95–5.11)
SODIUM SERPL-SCNC: 142 MMOL/L (ref 135–144)
TRIGL SERPL-MCNC: 44 MG/DL
WBC OTHER # BLD: 4.5 K/UL (ref 3.5–11.3)

## 2023-08-07 PROCEDURE — 99213 OFFICE O/P EST LOW 20 MIN: CPT | Performed by: NURSE PRACTITIONER

## 2023-08-07 PROCEDURE — 93000 ELECTROCARDIOGRAM COMPLETE: CPT | Performed by: NURSE PRACTITIONER

## 2023-08-07 SDOH — ECONOMIC STABILITY: INCOME INSECURITY: HOW HARD IS IT FOR YOU TO PAY FOR THE VERY BASICS LIKE FOOD, HOUSING, MEDICAL CARE, AND HEATING?: NOT HARD AT ALL

## 2023-08-07 SDOH — ECONOMIC STABILITY: FOOD INSECURITY: WITHIN THE PAST 12 MONTHS, YOU WORRIED THAT YOUR FOOD WOULD RUN OUT BEFORE YOU GOT MONEY TO BUY MORE.: NEVER TRUE

## 2023-08-07 SDOH — ECONOMIC STABILITY: HOUSING INSECURITY
IN THE LAST 12 MONTHS, WAS THERE A TIME WHEN YOU DID NOT HAVE A STEADY PLACE TO SLEEP OR SLEPT IN A SHELTER (INCLUDING NOW)?: NO

## 2023-08-07 SDOH — ECONOMIC STABILITY: FOOD INSECURITY: WITHIN THE PAST 12 MONTHS, THE FOOD YOU BOUGHT JUST DIDN'T LAST AND YOU DIDN'T HAVE MONEY TO GET MORE.: NEVER TRUE

## 2023-08-07 ASSESSMENT — ENCOUNTER SYMPTOMS
NAUSEA: 0
ABDOMINAL PAIN: 0
VOMITING: 0
SINUS PAIN: 0
SHORTNESS OF BREATH: 0
SORE THROAT: 0
DIARRHEA: 0
COUGH: 0

## 2023-08-07 ASSESSMENT — PATIENT HEALTH QUESTIONNAIRE - PHQ9
SUM OF ALL RESPONSES TO PHQ QUESTIONS 1-9: 0
SUM OF ALL RESPONSES TO PHQ QUESTIONS 1-9: 0
1. LITTLE INTEREST OR PLEASURE IN DOING THINGS: 0
SUM OF ALL RESPONSES TO PHQ QUESTIONS 1-9: 0
2. FEELING DOWN, DEPRESSED OR HOPELESS: 0
SUM OF ALL RESPONSES TO PHQ9 QUESTIONS 1 & 2: 0
SUM OF ALL RESPONSES TO PHQ QUESTIONS 1-9: 0

## 2023-08-07 NOTE — PROGRESS NOTES
[facility]  [address]  [address] [address]  Dept: [phone]  Dept Fax: [phone]    Cheng Bautista is a 62 y.o. female who presents today for her medicalconditions/complaints as noted below. Cheng Bautista is c/o of Pre-op Exam      HPI:     68-year-old female patient presents with complaints of encounter to follow up     History of GERD, cirrhosis. Patient follows with [facility]. Current medication regimen includes omeprazole, lactulose, spironolactone     History of stroke with right-sided upper extremity deficit. [facility]. History of anxiety currently treats with amitriptyline 25 mg nightly, stable     Reportedly has upcoming breast augmentation revision surgery with plastic surgeon. Past Medical History:   Diagnosis Date    Anemia     Anxiety     Cirrhosis of liver (HCC)     Indigestion     Stroke (cerebrum) (HCC)         Current Outpatient Medications   Medication Sig Dispense Refill    latanoprost (XALATAN) 0.005 % ophthalmic solution INSTILL 1 DROP BOTH EYES EVERY NIGHT AT BEDTIME      moxifloxacin (VIGAMOX) 0.5 % ophthalmic solution INSTILL 1 DROP INTO THE LEFT EYE FOUR TIMES DAILY      polyethylene glycol-electrolytes (NULYTELY) 420 g solution MIX AND DRINK AS DIRECTED      lactulose (CHRONULAC) 10 GM/15ML solution TAKE 15 MLS BY MOUTH FOUR TIMES DAILY      esomeprazole (NEXIUM) 20 MG delayed release capsule       amitriptyline (ELAVIL) 25 MG tablet TAKE 1 TABLET BY MOUTH EVERY EVENING 90 tablet 1    lactulose encephalopathy (GENERLAC) 10 GM/15ML SOLN solution Take 15 mLs by mouth 2 times daily 900 mL 0    multivitamin (OCUVITE) TABS per tablet Take 1 tablet by mouth daily 30 tablet 3    benzonatate (TESSALON) 100 MG capsule TAKE 1 CAPSULE BY MOUTH THREE TIMES DAILY AS NEEDED FOR COUGH 21 capsule 1    Misc.  Devices MISC 1 each by Does not apply route once for 1 dose OA

## 2023-08-07 NOTE — PROGRESS NOTES
Visit Information    Have you changed or started any medications since your last visit including any over-the-counter medicines, vitamins, or herbal medicines? no   Have you stopped taking any of your medications? Is so, why? -  no  Are you having any side effects from any of your medications? - no    Have you seen any other physician or provider since your last visit?  no   Have you had any other diagnostic tests since your last visit?  no   Have you been seen in the emergency room and/or had an admission in a hospital since we last saw you?  no   Have you had your routine dental cleaning in the past 6 months?  no     Do you have an active MyChart account? If no, what is the barrier?   Yes    Patient Care Team:  LUCY Valladares CNP as PCP - General (Certified Nurse Practitioner)  LUCY Valladares CNP as PCP - Empaneled Provider    Medical History Review  Past Medical, Family, and Social History reviewed and  contribute to the patient presenting condition    Health Maintenance   Topic Date Due    COVID-19 Vaccine (1) Never done    Hepatitis A vaccine (1 of 2 - Risk 2-dose series) Never done    Pneumococcal 0-64 years Vaccine (1 - PCV) Never done    HIV screen  Never done    Hepatitis B vaccine (1 of 3 - Risk 3-dose series) Never done    Shingles vaccine (1 of 2) Never done    Lipids  10/20/2020    Colorectal Cancer Screen  04/16/2022    Breast cancer screen  11/16/2022    Flu vaccine (1) Never done    Depression Screen  07/28/2023    Cervical cancer screen  11/18/2024    DTaP/Tdap/Td vaccine (2 - Td or Tdap) 03/19/2029    Hepatitis C screen  Completed    Hib vaccine  Aged Out    Meningococcal (ACWY) vaccine  Aged Out    Diabetes screen  Discontinued

## 2023-08-08 ENCOUNTER — TELEPHONE (OUTPATIENT)
Dept: FAMILY MEDICINE CLINIC | Age: 58
End: 2023-08-08

## 2023-08-22 DIAGNOSIS — R05.9 COUGH: ICD-10-CM

## 2023-08-22 RX ORDER — BENZONATATE 100 MG/1
CAPSULE ORAL
Qty: 21 CAPSULE | Refills: 1 | Status: SHIPPED | OUTPATIENT
Start: 2023-08-22

## 2023-11-06 DIAGNOSIS — R51.9 NONINTRACTABLE HEADACHE, UNSPECIFIED CHRONICITY PATTERN, UNSPECIFIED HEADACHE TYPE: ICD-10-CM

## 2023-11-06 DIAGNOSIS — F51.01 PRIMARY INSOMNIA: ICD-10-CM

## 2023-11-06 RX ORDER — AMITRIPTYLINE HYDROCHLORIDE 25 MG/1
25 TABLET, FILM COATED ORAL NIGHTLY
Qty: 90 TABLET | Refills: 1 | Status: SHIPPED | OUTPATIENT
Start: 2023-11-06

## 2023-12-13 ENCOUNTER — OFFICE VISIT (OUTPATIENT)
Dept: PRIMARY CARE CLINIC | Age: 58
End: 2023-12-13
Payer: COMMERCIAL

## 2023-12-13 ENCOUNTER — HOSPITAL ENCOUNTER (OUTPATIENT)
Age: 58
Setting detail: SPECIMEN
Discharge: HOME | End: 2023-12-13

## 2023-12-13 VITALS
HEART RATE: 91 BPM | WEIGHT: 178 LBS | DIASTOLIC BLOOD PRESSURE: 70 MMHG | SYSTOLIC BLOOD PRESSURE: 117 MMHG | BODY MASS INDEX: 30.55 KG/M2 | OXYGEN SATURATION: 98 %

## 2023-12-13 DIAGNOSIS — N30.00 ACUTE CYSTITIS WITHOUT HEMATURIA: Primary | ICD-10-CM

## 2023-12-13 DIAGNOSIS — R35.0 FREQUENT URINATION: ICD-10-CM

## 2023-12-13 LAB
BILIRUBIN, POC: NEGATIVE
BLOOD URINE, POC: ABNORMAL
CLARITY, POC: CLEAR
COLOR, POC: YELLOW
GLUCOSE URINE, POC: NEGATIVE
KETONES, POC: NEGATIVE
LEUKOCYTE EST, POC: ABNORMAL
NITRITE, POC: NEGATIVE
PH, POC: 6.5
PROTEIN, POC: NEGATIVE
SPECIFIC GRAVITY, POC: 1.01
UROBILINOGEN, POC: 0.2

## 2023-12-13 PROCEDURE — 81002 URINALYSIS NONAUTO W/O SCOPE: CPT

## 2023-12-13 PROCEDURE — 99213 OFFICE O/P EST LOW 20 MIN: CPT

## 2023-12-13 RX ORDER — PHENAZOPYRIDINE HYDROCHLORIDE 100 MG/1
100 TABLET, FILM COATED ORAL 3 TIMES DAILY PRN
Qty: 21 TABLET | Refills: 0 | Status: SHIPPED | OUTPATIENT
Start: 2023-12-13 | End: 2023-12-13 | Stop reason: SDUPTHER

## 2023-12-13 RX ORDER — PHENAZOPYRIDINE HYDROCHLORIDE 100 MG/1
100 TABLET, FILM COATED ORAL 3 TIMES DAILY PRN
Qty: 21 TABLET | Refills: 0 | Status: SHIPPED | OUTPATIENT
Start: 2023-12-13 | End: 2023-12-20

## 2023-12-13 RX ORDER — CEPHALEXIN 500 MG/1
500 CAPSULE ORAL 2 TIMES DAILY
Qty: 14 CAPSULE | Refills: 0 | Status: SHIPPED | OUTPATIENT
Start: 2023-12-13 | End: 2023-12-13 | Stop reason: SDUPTHER

## 2023-12-13 RX ORDER — CEPHALEXIN 500 MG/1
500 CAPSULE ORAL 2 TIMES DAILY
Qty: 14 CAPSULE | Refills: 0 | Status: SHIPPED | OUTPATIENT
Start: 2023-12-13 | End: 2023-12-16 | Stop reason: ALTCHOICE

## 2023-12-13 ASSESSMENT — ENCOUNTER SYMPTOMS
BLOOD IN STOOL: 0
SINUS PAIN: 0
CONSTIPATION: 0
APNEA: 0
PHOTOPHOBIA: 0
TROUBLE SWALLOWING: 0
VOMITING: 0
WHEEZING: 0
EYE ITCHING: 0
COLOR CHANGE: 0
SORE THROAT: 0
STRIDOR: 0
ABDOMINAL DISTENTION: 0
EYE DISCHARGE: 0
FACIAL SWELLING: 0
RHINORRHEA: 0
ANAL BLEEDING: 0
NAUSEA: 0
RECTAL PAIN: 0
VOICE CHANGE: 0
ABDOMINAL PAIN: 0
SINUS PRESSURE: 0
CHEST TIGHTNESS: 0
EYES NEGATIVE: 1
COUGH: 0
BACK PAIN: 0
SHORTNESS OF BREATH: 0
EYE REDNESS: 0
EYE PAIN: 0
CHOKING: 0
GASTROINTESTINAL NEGATIVE: 1
DIARRHEA: 0
RESPIRATORY NEGATIVE: 1

## 2023-12-13 NOTE — PROGRESS NOTES
for POC orders placed in visit on 12/13/23   POCT Urinalysis no Micro   Result Value Ref Range    Color, UA yellow     Clarity, UA clear     Glucose, UA POC negative     Bilirubin, UA negative     Ketones, UA negative     Spec Grav, UA 1.010     Blood, UA POC trace-intact     pH, UA 6.5     Protein, UA POC negative     Urobilinogen, UA 0.2     Leukocytes, UA small     Nitrite, UA negative      -Urine culture pending  -Poss tx alterations pending results  -Pyridium sent for dysuria relief  -F/u if symptoms persist  -Pt verbalized understanding of plan and management at this time    Follow Up Instructions:      Return if symptoms worsen or fail to improve. Orders Placed This Encounter   Medications    DISCONTD: phenazopyridine (PYRIDIUM) 100 MG tablet     Sig: Take 1 tablet by mouth 3 times daily as needed for Pain     Dispense:  21 tablet     Refill:  0    DISCONTD: cephALEXin (KEFLEX) 500 MG capsule     Sig: Take 1 capsule by mouth 2 times daily for 7 days     Dispense:  14 capsule     Refill:  0    cephALEXin (KEFLEX) 500 MG capsule     Sig: Take 1 capsule by mouth 2 times daily for 7 days     Dispense:  14 capsule     Refill:  0    phenazopyridine (PYRIDIUM) 100 MG tablet     Sig: Take 1 tablet by mouth 3 times daily as needed for Pain     Dispense:  21 tablet     Refill:  0           Patient instructed to complete entire antibiotic course. Increase fluid intake. Educational materials regarding UTI given on AVS.  Patient agreeable to treatment plan. Follow up if symptoms do not improve/worsen. Patient and/or parent given educational materials - see patient instructions. Discussed use, benefit, and side effects of prescribed medications. All patient questions answered. Patient and/or parent voiced understanding.       Electronically signed by LUCY Barnes 12/13/2023 at 4:24 PM

## 2023-12-14 DIAGNOSIS — R35.0 FREQUENT URINATION: ICD-10-CM

## 2023-12-16 ENCOUNTER — TELEPHONE (OUTPATIENT)
Dept: PRIMARY CARE CLINIC | Age: 58
End: 2023-12-16

## 2023-12-16 LAB
MICROORGANISM SPEC CULT: ABNORMAL
SPECIMEN DESCRIPTION: ABNORMAL

## 2023-12-16 RX ORDER — NITROFURANTOIN 25; 75 MG/1; MG/1
100 CAPSULE ORAL 2 TIMES DAILY
Qty: 14 CAPSULE | Refills: 0 | Status: SHIPPED | OUTPATIENT
Start: 2023-12-16 | End: 2023-12-23

## 2024-01-27 ENCOUNTER — OFFICE VISIT (OUTPATIENT)
Dept: PRIMARY CARE CLINIC | Age: 59
End: 2024-01-27
Payer: COMMERCIAL

## 2024-01-27 VITALS
HEART RATE: 103 BPM | TEMPERATURE: 98.6 F | BODY MASS INDEX: 30.39 KG/M2 | SYSTOLIC BLOOD PRESSURE: 145 MMHG | OXYGEN SATURATION: 98 % | HEIGHT: 64 IN | DIASTOLIC BLOOD PRESSURE: 82 MMHG | WEIGHT: 178 LBS

## 2024-01-27 DIAGNOSIS — J01.90 ACUTE NON-RECURRENT SINUSITIS, UNSPECIFIED LOCATION: ICD-10-CM

## 2024-01-27 DIAGNOSIS — H10.31 ACUTE BACTERIAL CONJUNCTIVITIS OF RIGHT EYE: Primary | ICD-10-CM

## 2024-01-27 PROCEDURE — 99213 OFFICE O/P EST LOW 20 MIN: CPT | Performed by: FAMILY MEDICINE

## 2024-01-27 RX ORDER — OFLOXACIN 3 MG/ML
1 SOLUTION/ DROPS OPHTHALMIC 4 TIMES DAILY
Qty: 5 ML | Refills: 0 | Status: SHIPPED | OUTPATIENT
Start: 2024-01-27 | End: 2024-02-06

## 2024-01-27 RX ORDER — CEPHALEXIN 500 MG/1
500 CAPSULE ORAL 4 TIMES DAILY
Qty: 28 CAPSULE | Refills: 0 | Status: SHIPPED | OUTPATIENT
Start: 2024-01-27 | End: 2024-02-03

## 2024-01-27 RX ORDER — TIMOLOL MALEATE 5 MG/ML
SOLUTION/ DROPS OPHTHALMIC
COMMUNITY
Start: 2023-12-28

## 2024-01-27 RX ORDER — KETOROLAC TROMETHAMINE 5 MG/ML
1 SOLUTION OPHTHALMIC 4 TIMES DAILY
Qty: 5 ML | Refills: 0 | Status: SHIPPED | OUTPATIENT
Start: 2024-01-27 | End: 2024-02-03

## 2024-01-27 ASSESSMENT — ENCOUNTER SYMPTOMS
EYE REDNESS: 1
FOREIGN BODY SENSATION: 0
BLURRED VISION: 0
EYE DISCHARGE: 1
NAUSEA: 0
VOMITING: 0
SORE THROAT: 0
EYE ITCHING: 1
CHANGE IN BOWEL HABIT: 0
DOUBLE VISION: 0

## 2024-01-27 NOTE — PROGRESS NOTES
[facility] PHYSICIANS [facility], [facility] [place] [facility]  [address]  [facility] [address]  Dept: [phone]  Dept Fax: [phone]    Eunice Posey is a 58 y.o. female who presents today for her medical conditions/complaintsas noted below.  Eunice Posey is c/o of Eye Problem (Currently on eye drop for eye infection not getting better. ) and Dizziness (Started today. )        HPI:     Eye Problem   The right eye is affected. This is a new problem. The current episode started 1 to 4 weeks ago (1 week). The problem occurs constantly. The problem has been unchanged. There was no injury mechanism. There is Known exposure to pink eye. She Wears contacts (patient reports she has not work any in a couple months). Associated symptoms include an eye discharge, eye redness, itching and a recent URI (runny, cough). Pertinent negatives include no blurred vision, double vision, fever, foreign body sensation, nausea or vomiting. Treatments tried: benadryl, moxifloxacin, antihistamine. The treatment provided no relief.   Dizziness  This is a new problem. The current episode started today. The problem occurs constantly. The problem has been unchanged. Associated symptoms include congestion, coughing and headaches. Pertinent negatives include no change in bowel habit, fever, nausea, sore throat or vomiting. Nothing aggravates the symptoms. Treatments tried: benadryl, tussin high blood pressure. The treatment provided mild relief.       Past Medical History:   Diagnosis Date    Anemia     Anxiety     Cirrhosis of liver (HCC)     Indigestion     Stroke (cerebrum) (HCC)     Past medical history reviewed and pertinent positives/negatives in the HPI    Past Surgical History:   Procedure Laterality Date    LIVER SURGERY  2015       Family History   Problem Relation Age of Onset    Migraines Paternal Grandmother     High Cholesterol Mother     Cancer Father     Heart Disease Father

## 2024-01-27 NOTE — PATIENT INSTRUCTIONS
Take keflex as prescribed for sinus infection. Use eye drops as prescribed for eye infection and eye pain.   If symptoms worsen or do not improve please follow-up with PCP or return to clinic  Recommend to follow up with ophthalmologist asap to make sure that no additional damage was done to eye due to extended contact wear

## 2024-02-07 DIAGNOSIS — F51.01 PRIMARY INSOMNIA: ICD-10-CM

## 2024-02-07 DIAGNOSIS — R51.9 NONINTRACTABLE HEADACHE, UNSPECIFIED CHRONICITY PATTERN, UNSPECIFIED HEADACHE TYPE: ICD-10-CM

## 2024-02-07 RX ORDER — AMITRIPTYLINE HYDROCHLORIDE 25 MG/1
25 TABLET, FILM COATED ORAL NIGHTLY
Qty: 90 TABLET | Refills: 1 | Status: SHIPPED | OUTPATIENT
Start: 2024-02-07

## 2024-04-11 ENCOUNTER — OFFICE VISIT (OUTPATIENT)
Dept: PRIMARY CARE CLINIC | Age: 59
End: 2024-04-11
Payer: COMMERCIAL

## 2024-04-11 VITALS — DIASTOLIC BLOOD PRESSURE: 82 MMHG | OXYGEN SATURATION: 98 % | SYSTOLIC BLOOD PRESSURE: 128 MMHG | HEART RATE: 92 BPM
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
## 2024-04-11 DIAGNOSIS — S40.021A CONTUSION OF RIGHT UPPER ARM, INITIAL ENCOUNTER: Primary | ICD-10-CM

## 2024-04-11 PROCEDURE — 99213 OFFICE O/P EST LOW 20 MIN: CPT | Performed by: NURSE PRACTITIONER

## 2024-04-11 RX ORDER — FUROSEMIDE 20 MG/1
20 TABLET ORAL DAILY
COMMUNITY
Start: 2024-04-10

## 2024-04-11 RX ORDER — ACETAMINOPHEN AND CODEINE PHOSPHATE 300; 30 MG/1; MG/1
1-2 TABLET ORAL EVERY 6 HOURS PRN
Qty: 30 TABLET | Refills: 0 | Status: SHIPPED | OUTPATIENT
Start: 2024-04-11 | End: 2024-04-16

## 2024-04-11 ASSESSMENT — ENCOUNTER SYMPTOMS
RESPIRATORY NEGATIVE: 1
CHEST TIGHTNESS: 0
COLOR CHANGE: 0
SHORTNESS OF BREATH: 0
WHEEZING: 0
COUGH: 0

## 2024-04-11 NOTE — PROGRESS NOTES
[facility] PHYSICIANS [facility],  [facility]  [address] [facility] [address]  Dept: [phone]  Dept Fax: [phone]     Eunice Posey is a 58 y.o. female who presents to the urgent care today for her medicalconditions/complaints as noted below.  Eunice Posey is c/o of Shoulder Pain (Right shoulder pain s/p fall today )    HPI:      Shoulder Pain   The pain is present in the right shoulder. This is a new problem. The current episode started today. There has been a history of trauma (s/p fall). The problem has been unchanged. The quality of the pain is described as aching. The pain is moderate. Associated symptoms include a limited range of motion. Pertinent negatives include no fever. Treatments tried: otc tx. The treatment provided no relief.     Past Medical History:   Diagnosis Date    Anemia     Anxiety     Cirrhosis of liver (HCC)     Indigestion     Stroke (cerebrum) (HCC)       Current Outpatient Medications   Medication Sig Dispense Refill    furosemide (LASIX) 20 MG tablet Take 1 tablet by mouth daily      acetaminophen-codeine (TYLENOL/CODEINE #3) 300-30 MG per tablet Take 1-2 tablets by mouth every 6 hours as needed for Pain for up to 5 days. Intended supply: 3 days. Take lowest dose possible to manage pain Max Daily Amount: 8 tablets 30 tablet 0    amitriptyline (ELAVIL) 25 MG tablet TAKE 1 TABLET BY MOUTH EVERY NIGHT 90 tablet 1    timolol (TIMOPTIC) 0.5 % ophthalmic solution INSTILL 1 DROP INTO BOTH EYES TWICE DAILY      latanoprost (XALATAN) 0.005 % ophthalmic solution INSTILL 1 DROP BOTH EYES EVERY NIGHT AT BEDTIME      moxifloxacin (VIGAMOX) 0.5 % ophthalmic solution INSTILL 1 DROP INTO THE LEFT EYE FOUR TIMES DAILY      polyethylene glycol-electrolytes (NULYTELY) 420 g solution MIX AND DRINK AS DIRECTED      lactulose (CHRONULAC) 10 GM/15ML solution TAKE 15 MLS BY MOUTH FOUR TIMES DAILY      esomeprazole (NEXIUM) 20 MG delayed